# Patient Record
Sex: MALE | Race: OTHER | NOT HISPANIC OR LATINO
[De-identification: names, ages, dates, MRNs, and addresses within clinical notes are randomized per-mention and may not be internally consistent; named-entity substitution may affect disease eponyms.]

---

## 2023-04-04 PROBLEM — Z00.00 ENCOUNTER FOR PREVENTIVE HEALTH EXAMINATION: Status: ACTIVE | Noted: 2023-04-04

## 2023-04-05 ENCOUNTER — APPOINTMENT (OUTPATIENT)
Dept: OTOLARYNGOLOGY | Facility: CLINIC | Age: 38
End: 2023-04-05
Payer: MEDICAID

## 2023-04-05 VITALS — HEIGHT: 65 IN | BODY MASS INDEX: 26.66 KG/M2 | WEIGHT: 160 LBS

## 2023-04-05 DIAGNOSIS — Z86.39 PERSONAL HISTORY OF OTHER ENDOCRINE, NUTRITIONAL AND METABOLIC DISEASE: ICD-10-CM

## 2023-04-05 DIAGNOSIS — Z78.9 OTHER SPECIFIED HEALTH STATUS: ICD-10-CM

## 2023-04-05 DIAGNOSIS — H61.21 IMPACTED CERUMEN, RIGHT EAR: ICD-10-CM

## 2023-04-05 DIAGNOSIS — J30.0 VASOMOTOR RHINITIS: ICD-10-CM

## 2023-04-05 PROCEDURE — 92567 TYMPANOMETRY: CPT

## 2023-04-05 PROCEDURE — 92557 COMPREHENSIVE HEARING TEST: CPT

## 2023-04-05 PROCEDURE — G0268 REMOVAL OF IMPACTED WAX MD: CPT

## 2023-04-05 PROCEDURE — 31231 NASAL ENDOSCOPY DX: CPT

## 2023-04-05 PROCEDURE — 99204 OFFICE O/P NEW MOD 45 MIN: CPT | Mod: 25

## 2023-04-05 RX ORDER — FLUTICASONE PROPIONATE 50 UG/1
50 SPRAY, METERED NASAL
Qty: 1 | Refills: 3 | Status: ACTIVE | COMMUNITY
Start: 2023-04-05 | End: 1900-01-01

## 2023-04-05 RX ORDER — SIMVASTATIN 80 MG/1
TABLET, FILM COATED ORAL
Refills: 0 | Status: ACTIVE | COMMUNITY

## 2023-04-05 NOTE — HISTORY OF PRESENT ILLNESS
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  This is associated with frequently recurrent ear infections throughout his life.  Use of otic drops in the past.  Otorrhea occurs monthly.  Hears better on the left side.  No prior surgery in the ear.

## 2023-04-05 NOTE — DATA REVIEWED
[de-identified] : In light of the patients current symptoms, Complete audiometry was ordered and completed today. I have interpreted these results and reviewed them in detail with the patient.\par \par Bilateral conductive hearing loss

## 2023-04-05 NOTE — PROCEDURE
[FreeTextEntry6] : PROCEDURE:  NASAL ENDOSCOPY  \par \par Surgeon: Dr. Orr\par Indication: Chronic Rhinitis\par Anesthetic: Topical lidocaine and Afrin\par Procedure: The patient was placed in a sitting position.  Following application of the topical anesthetic and decongestant, exam was performed with a flexible endoscope.  The following anatomic sites were directly examined bilaterally in a sequential fashion:\par The scope was introduced in the nasal passage between the middle and inferior turbinates to exam the inferior portion of the middle meatus and the fontanelle, as well as the maxillary ostia. Next, the scope was passed medially and posteriorly to the middle turbinates to examine the sphenoethmoid recess and the superior turbinate region.\par \par Nasopharynx: no masses, choanae patent, no adenoid tissue\par \par The following findings were noted:\par Mild to moderate turbinate hypertrophy causing partial airway obstruction.  The eustachian tube orifice was narrow with surrounding edema bilaterally

## 2023-04-05 NOTE — PHYSICAL EXAM
[Nasal Endoscopy Performed] : nasal endoscopy was performed, see procedure section for findings [Midline] : trachea located in midline position [Normal] : no rashes [FreeTextEntry1] : Microscopic ear exam with cerumen debridement:\par \par Right ear: Obstructing cerumen was debrided from the ear canal using suction, and curet.  The ear canal was within normal limits.  Posterior superior tympanic membrane retraction with atelectasis of the remaining portions of the tympanic membrane.\par \par \par Left ear: The ear canal was patent and nonobstructed.  Anterior superior tympanic membrane perforation with general tympanic membrane retraction [de-identified] : Enlarged

## 2023-04-05 NOTE — ASSESSMENT
[FreeTextEntry1] : Evidence of chronic eustachian tube dysfunction with tympanic membrane retraction and bilateral perforations.  There is significant hearing loss present bilaterally.  The eustachian tube orifice ease appear to be narrowly shot.\par \par I have reviewed this with the patient in detail.  I have carefully reviewed management options.  I recommended consistent use of steroidal nasal spray.  I have referred him for CT scan evaluation of the temporal bone.\par \par We discussed management options including but not limited to surgical repair of the right middle ear, balloon dilation of the eustachian tube, amplification, bone-conduction implant\par \par I have recommended consistent use of steroidal spray with follow-up in approximately 2 months after CT imaging is complete.  Further management discussions will be made.

## 2023-04-05 NOTE — CONSULT LETTER
[Please see my note below.] : Please see my note below. [FreeTextEntry2] : Dear AMY GLORIA  [FreeTextEntry1] : Thank you for allowing me to participate in the care of MD YADAV .\par Please see the attached visit note.\par \par \par \par Epifanio Orr\par Otology\par Medical Director of [facility]\par Department of Otolaryngology\par [facility]

## 2023-04-20 ENCOUNTER — RESULT REVIEW (OUTPATIENT)
Age: 38
End: 2023-04-20

## 2023-04-27 ENCOUNTER — OUTPATIENT (OUTPATIENT)
Dept: OUTPATIENT SERVICES | Facility: HOSPITAL | Age: 38
LOS: 1 days | End: 2023-04-27

## 2023-04-27 ENCOUNTER — APPOINTMENT (OUTPATIENT)
Dept: CT IMAGING | Facility: CLINIC | Age: 38
End: 2023-04-27
Payer: MEDICAID

## 2023-04-27 PROCEDURE — 70480 CT ORBIT/EAR/FOSSA W/O DYE: CPT | Mod: 26

## 2023-05-25 ENCOUNTER — APPOINTMENT (OUTPATIENT)
Dept: OTOLARYNGOLOGY | Facility: CLINIC | Age: 38
End: 2023-05-25
Payer: MEDICAID

## 2023-05-25 VITALS — WEIGHT: 160 LBS | BODY MASS INDEX: 26.66 KG/M2 | HEIGHT: 65 IN

## 2023-05-25 DIAGNOSIS — H69.83 OTHER SPECIFIED DISORDERS OF EUSTACHIAN TUBE, BILATERAL: ICD-10-CM

## 2023-05-25 DIAGNOSIS — H90.0 CONDUCTIVE HEARING LOSS, BILATERAL: ICD-10-CM

## 2023-05-25 DIAGNOSIS — H72.93 OTITIS MEDIA, UNSPECIFIED, BILATERAL: ICD-10-CM

## 2023-05-25 DIAGNOSIS — H61.20 IMPACTED CERUMEN, UNSPECIFIED EAR: ICD-10-CM

## 2023-05-25 DIAGNOSIS — H66.93 OTITIS MEDIA, UNSPECIFIED, BILATERAL: ICD-10-CM

## 2023-05-25 PROCEDURE — 69210 REMOVE IMPACTED EAR WAX UNI: CPT

## 2023-05-25 PROCEDURE — 99214 OFFICE O/P EST MOD 30 MIN: CPT | Mod: 25

## 2023-05-25 RX ORDER — OFLOXACIN OTIC 3 MG/ML
0.3 SOLUTION AURICULAR (OTIC) TWICE DAILY
Qty: 1 | Refills: 3 | Status: ACTIVE | COMMUNITY
Start: 2023-05-25 | End: 1900-01-01

## 2023-05-25 NOTE — REASON FOR VISIT
[Subsequent Evaluation] : a subsequent evaluation for [Hearing Loss] : hearing loss [FreeTextEntry2] : ct review

## 2023-05-25 NOTE — PHYSICAL EXAM
[Normal] : temporomandibular joint is normal [FreeTextEntry1] : Microscopic ear exam with cerumen debridement:\par \par Right ear: Obstructing cerumen was debrided from the ear canal using suction, and curet.  The ear canal was within normal limits.  Posterior superior tympanic membrane retraction with atelectasis of the remaining portions of the tympanic membrane. absent landmarks. No inflammation.\par \par \par Left ear: The ear canal was patent and nonobstructed.  Anterior superior tympanic membrane perforation with general tympanic membrane retraction

## 2023-05-25 NOTE — DATA REVIEWED
[de-identified] : previous audiometry reviewed [de-identified] : cT scan including images reviewed in detail.

## 2023-05-25 NOTE — HISTORY OF PRESENT ILLNESS
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 This is associated with frequently recurrent ear infections throughout his life. Use of otic drops in the past. Otorrhea occurs monthly. Hears better on the left side. No prior surgery in the ear.  [FreeTextEntry1] : 05/25/2023 \par No change in hearing reported. No otorrhea at this time. underwent CT scan.

## 2023-05-25 NOTE — CONSULT LETTER
[Please see my note below.] : Please see my note below. [FreeTextEntry2] : Dear AMY GLORIA  [FreeTextEntry1] : Thank you for allowing me to participate in the care of MD YADAV .\par Please see the attached visit note.\par \par \par \par Epifanio Orr\par Otology\par Medical Director of [facility]\par Department of Otolaryngology\par [facility]

## 2023-05-25 NOTE — ASSESSMENT
[FreeTextEntry1] : Significant bilateral hearing loss with CT evidence of chronic inflammatory changes bilaterally.  Management strategies carefully reviewed with the patient in detail.  We discussed the options of observation, amplification, and surgical intervention.  We discussed the realistic expectations from surgical intervention.\par \par Hearing aid trial recommended\par The option of bone conduction hearing devices discussed.\par \par If the patient wishes surgical intervention, I have recommended eustachian tube balloon dilation for the longstanding eustachian tube dysfunction and tympanic membrane perforations.  Surgical intervention to follow-up would include tympanic membrane repair and ossicular chain reconstruction.

## 2023-06-23 ENCOUNTER — APPOINTMENT (OUTPATIENT)
Dept: OTOLARYNGOLOGY | Facility: CLINIC | Age: 38
End: 2023-06-23
Payer: MEDICAID

## 2023-06-23 PROCEDURE — V5010 ASSESSMENT FOR HEARING AID: CPT

## 2023-08-05 ENCOUNTER — EMERGENCY (EMERGENCY)
Facility: HOSPITAL | Age: 38
LOS: 1 days | Discharge: HOME | End: 2023-08-05
Attending: EMERGENCY MEDICINE
Payer: COMMERCIAL

## 2023-08-05 VITALS
HEART RATE: 81 BPM | DIASTOLIC BLOOD PRESSURE: 97 MMHG | WEIGHT: 161.16 LBS | HEIGHT: 65 IN | OXYGEN SATURATION: 97 % | RESPIRATION RATE: 16 BRPM | TEMPERATURE: 97 F | SYSTOLIC BLOOD PRESSURE: 136 MMHG

## 2023-08-05 PROCEDURE — 69200 CLEAR OUTER EAR CANAL: CPT | Mod: LT

## 2023-08-05 PROCEDURE — 99282 EMERGENCY DEPT VISIT SF MDM: CPT | Mod: 25

## 2023-08-05 PROCEDURE — 99283 EMERGENCY DEPT VISIT LOW MDM: CPT

## 2023-08-05 NOTE — ED ADULT TRIAGE NOTE - CHIEF COMPLAINT QUOTE
As per pt, c/o feels like cotton from the q-tip is stuck in his x2 hours today. Pt denies all other symptoms.

## 2023-08-05 NOTE — ED PROVIDER NOTE - ATTENDING APP SHARED VISIT CONTRIBUTION OF CARE
39 yo M with ear foreign body cotton from Q tip    Foreign body removed from L ear by WILLAM Soriano Dc

## 2023-08-05 NOTE — ED PROVIDER NOTE - OBJECTIVE STATEMENT
Patient is a 38 year old male who comes in complaining of foreign body in left ear canal that started 2 hours ago. Patient presents with cotton stuck in his ear. Patient has no dizziness, drainage, change in hearing or any other complains.

## 2023-08-05 NOTE — ED PROVIDER NOTE - NS ED ATTENDING STATEMENT MOD
This was a shared visit with the FAHAD. I reviewed and verified the documentation and independently performed the documented:

## 2023-08-05 NOTE — ED PROVIDER NOTE - PATIENT PORTAL LINK FT
You can access the FollowMyHealth Patient Portal offered by [facility] by registering at the following website: [email or web address]. By joining [facility]’s FollowMyHealth portal, you will also be able to view your health information using other applications (apps) compatible with our system.

## 2023-09-11 ENCOUNTER — NON-APPOINTMENT (OUTPATIENT)
Age: 38
End: 2023-09-11

## 2023-09-21 ENCOUNTER — APPOINTMENT (OUTPATIENT)
Dept: OTOLARYNGOLOGY | Facility: CLINIC | Age: 38
End: 2023-09-21

## 2023-10-05 ENCOUNTER — APPOINTMENT (OUTPATIENT)
Dept: OTOLARYNGOLOGY | Facility: CLINIC | Age: 38
End: 2023-10-05
Payer: COMMERCIAL

## 2023-10-05 PROCEDURE — V5260G: CUSTOM

## 2023-10-05 PROCEDURE — V5160: CPT

## 2023-10-18 ENCOUNTER — APPOINTMENT (OUTPATIENT)
Dept: OTOLARYNGOLOGY | Facility: CLINIC | Age: 38
End: 2023-10-18
Payer: COMMERCIAL

## 2023-10-18 PROCEDURE — V5266A: CUSTOM

## 2023-10-25 ENCOUNTER — APPOINTMENT (OUTPATIENT)
Dept: OTOLARYNGOLOGY | Facility: CLINIC | Age: 38
End: 2023-10-25
Payer: MEDICAID

## 2023-10-25 PROCEDURE — V5267B: CUSTOM

## 2023-10-25 PROCEDURE — V5266A: CUSTOM

## 2023-10-25 PROCEDURE — V5011: CPT

## 2024-07-26 ENCOUNTER — APPOINTMENT (OUTPATIENT)
Dept: OTOLARYNGOLOGY | Facility: CLINIC | Age: 39
End: 2024-07-26
Payer: COMMERCIAL

## 2024-07-26 DIAGNOSIS — H90.0 CONDUCTIVE HEARING LOSS, BILATERAL: ICD-10-CM

## 2024-07-26 PROCEDURE — 92593: CPT | Mod: NC

## 2024-09-02 NOTE — ED ADULT TRIAGE NOTE - NS ED NURSE BANDS TYPE
Problem: Adult Inpatient Plan of Care  Goal: Plan of Care Review  Outcome: Met  Goal: Patient-Specific Goal (Individualized)  Outcome: Met  Goal: Absence of Hospital-Acquired Illness or Injury  Outcome: Met  Goal: Optimal Comfort and Wellbeing  Outcome: Met  Goal: Readiness for Transition of Care  Outcome: Met     Problem: Sepsis/Septic Shock  Goal: Optimal Coping  Outcome: Met  Goal: Absence of Bleeding  Outcome: Met  Goal: Blood Glucose Level Within Targeted Range  Outcome: Met  Goal: Absence of Infection Signs and Symptoms  Outcome: Met  Goal: Optimal Nutrition Intake  Outcome: Met     Adequate for Discharge   Name band;

## 2024-12-13 ENCOUNTER — INPATIENT (INPATIENT)
Facility: HOSPITAL | Age: 39
LOS: 4 days | Discharge: HOME | DRG: 948 | End: 2024-12-18
Attending: INTERNAL MEDICINE | Admitting: INTERNAL MEDICINE
Payer: MEDICAID

## 2024-12-13 VITALS
WEIGHT: 151.9 LBS | HEIGHT: 65 IN | TEMPERATURE: 98 F | SYSTOLIC BLOOD PRESSURE: 100 MMHG | OXYGEN SATURATION: 100 % | RESPIRATION RATE: 18 BRPM | HEART RATE: 94 BPM | DIASTOLIC BLOOD PRESSURE: 69 MMHG

## 2024-12-13 LAB
ALBUMIN SERPL ELPH-MCNC: 4 G/DL — SIGNIFICANT CHANGE UP (ref 3.5–5)
ALP SERPL-CCNC: 49 U/L — SIGNIFICANT CHANGE UP (ref 40–120)
ALT FLD-CCNC: 54 U/L DA — SIGNIFICANT CHANGE UP (ref 10–60)
ANION GAP SERPL CALC-SCNC: 3 MMOL/L — LOW (ref 5–17)
AST SERPL-CCNC: 23 U/L — SIGNIFICANT CHANGE UP (ref 10–40)
BASOPHILS # BLD AUTO: 0.04 K/UL — SIGNIFICANT CHANGE UP (ref 0–0.2)
BASOPHILS NFR BLD AUTO: 0.2 % — SIGNIFICANT CHANGE UP (ref 0–2)
BILIRUB SERPL-MCNC: 0.5 MG/DL — SIGNIFICANT CHANGE UP (ref 0.2–1.2)
BUN SERPL-MCNC: 40 MG/DL — HIGH (ref 7–18)
CALCIUM SERPL-MCNC: 8.7 MG/DL — SIGNIFICANT CHANGE UP (ref 8.4–10.5)
CHLORIDE SERPL-SCNC: 111 MMOL/L — HIGH (ref 96–108)
CO2 SERPL-SCNC: 27 MMOL/L — SIGNIFICANT CHANGE UP (ref 22–31)
CREAT SERPL-MCNC: 0.83 MG/DL — SIGNIFICANT CHANGE UP (ref 0.5–1.3)
EGFR: 113 ML/MIN/1.73M2 — SIGNIFICANT CHANGE UP
EOSINOPHIL # BLD AUTO: 0 K/UL — SIGNIFICANT CHANGE UP (ref 0–0.5)
EOSINOPHIL NFR BLD AUTO: 0 % — SIGNIFICANT CHANGE UP (ref 0–6)
GLUCOSE SERPL-MCNC: 145 MG/DL — HIGH (ref 70–99)
HCT VFR BLD CALC: 32.6 % — LOW (ref 39–50)
HGB BLD-MCNC: 11 G/DL — LOW (ref 13–17)
IMM GRANULOCYTES NFR BLD AUTO: 0.4 % — SIGNIFICANT CHANGE UP (ref 0–0.9)
LYMPHOCYTES # BLD AUTO: 1.97 K/UL — SIGNIFICANT CHANGE UP (ref 1–3.3)
LYMPHOCYTES # BLD AUTO: 11.1 % — LOW (ref 13–44)
MCHC RBC-ENTMCNC: 30.4 PG — SIGNIFICANT CHANGE UP (ref 27–34)
MCHC RBC-ENTMCNC: 33.7 G/DL — SIGNIFICANT CHANGE UP (ref 32–36)
MCV RBC AUTO: 90.1 FL — SIGNIFICANT CHANGE UP (ref 80–100)
MONOCYTES # BLD AUTO: 0.48 K/UL — SIGNIFICANT CHANGE UP (ref 0–0.9)
MONOCYTES NFR BLD AUTO: 2.7 % — SIGNIFICANT CHANGE UP (ref 2–14)
NEUTROPHILS # BLD AUTO: 15.24 K/UL — HIGH (ref 1.8–7.4)
NEUTROPHILS NFR BLD AUTO: 85.6 % — HIGH (ref 43–77)
NRBC # BLD: 0 /100 WBCS — SIGNIFICANT CHANGE UP (ref 0–0)
PLATELET # BLD AUTO: 232 K/UL — SIGNIFICANT CHANGE UP (ref 150–400)
POTASSIUM SERPL-MCNC: 4.7 MMOL/L — SIGNIFICANT CHANGE UP (ref 3.5–5.3)
POTASSIUM SERPL-SCNC: 4.7 MMOL/L — SIGNIFICANT CHANGE UP (ref 3.5–5.3)
PROT SERPL-MCNC: 6.6 G/DL — SIGNIFICANT CHANGE UP (ref 6–8.3)
RBC # BLD: 3.62 M/UL — LOW (ref 4.2–5.8)
RBC # FLD: 12.7 % — SIGNIFICANT CHANGE UP (ref 10.3–14.5)
SODIUM SERPL-SCNC: 141 MMOL/L — SIGNIFICANT CHANGE UP (ref 135–145)
TROPONIN I, HIGH SENSITIVITY RESULT: 149.4 NG/L — HIGH
WBC # BLD: 17.81 K/UL — HIGH (ref 3.8–10.5)
WBC # FLD AUTO: 17.81 K/UL — HIGH (ref 3.8–10.5)

## 2024-12-13 PROCEDURE — 99285 EMERGENCY DEPT VISIT HI MDM: CPT

## 2024-12-13 PROCEDURE — 71045 X-RAY EXAM CHEST 1 VIEW: CPT | Mod: 26

## 2024-12-13 NOTE — ED PROVIDER NOTE - OBJECTIVE STATEMENT
39-year-old male with a history of CAD and a stent placed last year and history of hypertension, diabetes, hypercholesteremia comes in complaining of dizziness today he states his remote head is spinning that occurs when he is moving around.  He feels weak.  And fatigue.  No nausea, vomiting, diarrhea no fever.

## 2024-12-13 NOTE — ED PROVIDER NOTE - CLINICAL SUMMARY MEDICAL DECISION MAKING FREE TEXT BOX
39-year-old male with a history of CAD and a stent placed last year and history of hypertension, diabetes, hypercholesteremia comes in complaining of dizziness today he states his remote head is spinning that occurs when he is moving around.  He feels weak.  And fatigue.  No nausea, vomiting, diarrhea no fever.    Vital signs are stable.  Patient does not appear to be in distress.  Skin looks pale conjunctiva is pink palmar pallor.  No diaphoresis.  Neuro is nonfocal.  Gait is steady with intermittent episodes of unsteadiness.  No nystagmus.  No focal deficits.  Cardiac exam within normal limits lungs are clear breath sounds are equal bilaterally abdomen is soft nontender.  Plan CBC, chemistry, IV fluids chest x-ray urinalysis. 39-year-old male with a history of CAD and a stent placed last year and history of hypertension, diabetes, hypercholesteremia comes in complaining of dizziness today he states his remote head is spinning that occurs when he is moving around.  He feels weak.  And fatigue.  No nausea, vomiting, diarrhea no fever. patient takes Plavix and aspirin.  And denies chest pain.    Vital signs are stable.  Patient does not appear to be in distress.  Skin looks pale conjunctiva is pink palmar pallor.  No diaphoresis.  Neuro is nonfocal.  Gait is steady with intermittent episodes of unsteadiness.  No nystagmus.  No focal deficits.  Cardiac exam within normal limits lungs are clear breath sounds are equal bilaterally abdomen is soft nontender.  Plan CBC, chemistry, IV fluids chest x-ray urinalysis.

## 2024-12-13 NOTE — ED ADULT NURSE NOTE - CHIEF COMPLAINT QUOTE
Unable to assess due to medical condition
BIBA for dizziness since @ 1500, get dizzy w position changes

## 2024-12-13 NOTE — ED ADULT NURSE NOTE - NSFALLHARMRISKINTERV_ED_ALL_ED
Assistance OOB with selected safe patient handling equipment if applicable/Communicate risk of Fall with Harm to all staff, patient, and family/Provide visual cue: red socks, yellow wristband, yellow gown, etc/Reinforce activity limits and safety measures with patient and family/Bed in lowest position, wheels locked, appropriate side rails in place/Call bell, personal items and telephone in reach/Instruct patient to call for assistance before getting out of bed/chair/stretcher/Non-slip footwear applied when patient is off stretcher/[place] to call system/Physically safe environment - no spills, clutter or unnecessary equipment/Purposeful Proactive Rounding/Room/bathroom lighting operational, light cord in reach

## 2024-12-13 NOTE — ED ADULT NURSE NOTE - OBJECTIVE STATEMENT
Patient came in ED c/o dizziness. Patient denies chest pain. Patient said he have cardiac stent placed last year, patient on Plavix and aspirin. Patient seen the cardiologist last july.

## 2024-12-14 DIAGNOSIS — R42 DIZZINESS AND GIDDINESS: ICD-10-CM

## 2024-12-14 DIAGNOSIS — Z95.5 PRESENCE OF CORONARY ANGIOPLASTY IMPLANT AND GRAFT: Chronic | ICD-10-CM

## 2024-12-14 DIAGNOSIS — I25.10 ATHEROSCLEROTIC HEART DISEASE OF NATIVE CORONARY ARTERY WITHOUT ANGINA PECTORIS: ICD-10-CM

## 2024-12-14 DIAGNOSIS — A41.9 SEPSIS, UNSPECIFIED ORGANISM: ICD-10-CM

## 2024-12-14 DIAGNOSIS — Z29.9 ENCOUNTER FOR PROPHYLACTIC MEASURES, UNSPECIFIED: ICD-10-CM

## 2024-12-14 DIAGNOSIS — I10 ESSENTIAL (PRIMARY) HYPERTENSION: ICD-10-CM

## 2024-12-14 DIAGNOSIS — E78.5 HYPERLIPIDEMIA, UNSPECIFIED: ICD-10-CM

## 2024-12-14 DIAGNOSIS — R79.89 OTHER SPECIFIED ABNORMAL FINDINGS OF BLOOD CHEMISTRY: ICD-10-CM

## 2024-12-14 DIAGNOSIS — E11.9 TYPE 2 DIABETES MELLITUS WITHOUT COMPLICATIONS: ICD-10-CM

## 2024-12-14 LAB
A1C WITH ESTIMATED AVERAGE GLUCOSE RESULT: 5.8 % — HIGH (ref 4–5.6)
ANION GAP SERPL CALC-SCNC: 6 MMOL/L — SIGNIFICANT CHANGE UP (ref 5–17)
APPEARANCE UR: CLEAR — SIGNIFICANT CHANGE UP
APTT BLD: 24.2 SEC — LOW (ref 24.5–35.6)
BASOPHILS # BLD AUTO: 0.03 K/UL — SIGNIFICANT CHANGE UP (ref 0–0.2)
BASOPHILS NFR BLD AUTO: 0.2 % — SIGNIFICANT CHANGE UP (ref 0–2)
BILIRUB UR-MCNC: NEGATIVE — SIGNIFICANT CHANGE UP
BLD GP AB SCN SERPL QL: SIGNIFICANT CHANGE UP
BUN SERPL-MCNC: 42 MG/DL — HIGH (ref 7–18)
CALCIUM SERPL-MCNC: 8.6 MG/DL — SIGNIFICANT CHANGE UP (ref 8.4–10.5)
CHLORIDE SERPL-SCNC: 111 MMOL/L — HIGH (ref 96–108)
CK MB BLD-MCNC: 2 % — SIGNIFICANT CHANGE UP (ref 0–3.5)
CK MB CFR SERPL CALC: 3.1 NG/ML — SIGNIFICANT CHANGE UP (ref 0–3.6)
CK SERPL-CCNC: 153 U/L — SIGNIFICANT CHANGE UP (ref 35–232)
CO2 SERPL-SCNC: 24 MMOL/L — SIGNIFICANT CHANGE UP (ref 22–31)
COLOR SPEC: YELLOW — SIGNIFICANT CHANGE UP
CREAT SERPL-MCNC: 0.88 MG/DL — SIGNIFICANT CHANGE UP (ref 0.5–1.3)
D DIMER BLD IA.RAPID-MCNC: <150 NG/ML DDU — SIGNIFICANT CHANGE UP
DIFF PNL FLD: NEGATIVE — SIGNIFICANT CHANGE UP
EGFR: 111 ML/MIN/1.73M2 — SIGNIFICANT CHANGE UP
EOSINOPHIL # BLD AUTO: 0 K/UL — SIGNIFICANT CHANGE UP (ref 0–0.5)
EOSINOPHIL NFR BLD AUTO: 0 % — SIGNIFICANT CHANGE UP (ref 0–6)
ESTIMATED AVERAGE GLUCOSE: 120 MG/DL — HIGH (ref 68–114)
FLUAV AG NPH QL: SIGNIFICANT CHANGE UP
FLUBV AG NPH QL: SIGNIFICANT CHANGE UP
GLUCOSE BLDC GLUCOMTR-MCNC: 122 MG/DL — HIGH (ref 70–99)
GLUCOSE BLDC GLUCOMTR-MCNC: 177 MG/DL — HIGH (ref 70–99)
GLUCOSE BLDC GLUCOMTR-MCNC: 187 MG/DL — HIGH (ref 70–99)
GLUCOSE BLDC GLUCOMTR-MCNC: 95 MG/DL — SIGNIFICANT CHANGE UP (ref 70–99)
GLUCOSE SERPL-MCNC: 159 MG/DL — HIGH (ref 70–99)
GLUCOSE UR QL: >=1000 MG/DL
HCT VFR BLD CALC: 29.7 % — LOW (ref 39–50)
HGB BLD-MCNC: 10 G/DL — LOW (ref 13–17)
IMM GRANULOCYTES NFR BLD AUTO: 0.7 % — SIGNIFICANT CHANGE UP (ref 0–0.9)
INR BLD: 1.15 RATIO — SIGNIFICANT CHANGE UP (ref 0.85–1.16)
KETONES UR-MCNC: 40 MG/DL
LACTATE SERPL-SCNC: 1.9 MMOL/L — SIGNIFICANT CHANGE UP (ref 0.7–2)
LACTATE SERPL-SCNC: 2.5 MMOL/L — HIGH (ref 0.7–2)
LACTATE SERPL-SCNC: 3 MMOL/L — HIGH (ref 0.7–2)
LACTATE SERPL-SCNC: 3.5 MMOL/L — HIGH (ref 0.7–2)
LEUKOCYTE ESTERASE UR-ACNC: NEGATIVE — SIGNIFICANT CHANGE UP
LYMPHOCYTES # BLD AUTO: 14.9 % — SIGNIFICANT CHANGE UP (ref 13–44)
LYMPHOCYTES # BLD AUTO: 2.43 K/UL — SIGNIFICANT CHANGE UP (ref 1–3.3)
MAGNESIUM SERPL-MCNC: 2.1 MG/DL — SIGNIFICANT CHANGE UP (ref 1.6–2.6)
MCHC RBC-ENTMCNC: 30.7 PG — SIGNIFICANT CHANGE UP (ref 27–34)
MCHC RBC-ENTMCNC: 33.7 G/DL — SIGNIFICANT CHANGE UP (ref 32–36)
MCV RBC AUTO: 91.1 FL — SIGNIFICANT CHANGE UP (ref 80–100)
MONOCYTES # BLD AUTO: 0.28 K/UL — SIGNIFICANT CHANGE UP (ref 0–0.9)
MONOCYTES NFR BLD AUTO: 1.7 % — LOW (ref 2–14)
NEUTROPHILS # BLD AUTO: 13.43 K/UL — HIGH (ref 1.8–7.4)
NEUTROPHILS NFR BLD AUTO: 82.5 % — HIGH (ref 43–77)
NITRITE UR-MCNC: NEGATIVE — SIGNIFICANT CHANGE UP
NRBC # BLD: 0 /100 WBCS — SIGNIFICANT CHANGE UP (ref 0–0)
NT-PROBNP SERPL-SCNC: 74 PG/ML — SIGNIFICANT CHANGE UP (ref 0–125)
PH UR: 5 — SIGNIFICANT CHANGE UP (ref 5–8)
PHOSPHATE SERPL-MCNC: 2.7 MG/DL — SIGNIFICANT CHANGE UP (ref 2.5–4.5)
PLATELET # BLD AUTO: 238 K/UL — SIGNIFICANT CHANGE UP (ref 150–400)
POTASSIUM SERPL-MCNC: 3.9 MMOL/L — SIGNIFICANT CHANGE UP (ref 3.5–5.3)
POTASSIUM SERPL-SCNC: 3.9 MMOL/L — SIGNIFICANT CHANGE UP (ref 3.5–5.3)
PROT UR-MCNC: NEGATIVE MG/DL — SIGNIFICANT CHANGE UP
PROTHROM AB SERPL-ACNC: 13.3 SEC — SIGNIFICANT CHANGE UP (ref 9.9–13.4)
RBC # BLD: 3.26 M/UL — LOW (ref 4.2–5.8)
RBC # FLD: 12.9 % — SIGNIFICANT CHANGE UP (ref 10.3–14.5)
RSV RNA NPH QL NAA+NON-PROBE: SIGNIFICANT CHANGE UP
SARS-COV-2 RNA SPEC QL NAA+PROBE: SIGNIFICANT CHANGE UP
SODIUM SERPL-SCNC: 141 MMOL/L — SIGNIFICANT CHANGE UP (ref 135–145)
SP GR SPEC: 1.04 — HIGH (ref 1–1.03)
T4 FREE SERPL-MCNC: 1 NG/DL — SIGNIFICANT CHANGE UP (ref 0.9–1.7)
TROPONIN I, HIGH SENSITIVITY RESULT: 149.8 NG/L — HIGH
TROPONIN I, HIGH SENSITIVITY RESULT: 154.7 NG/L — HIGH
TSH SERPL-MCNC: 1.99 UU/ML — SIGNIFICANT CHANGE UP (ref 0.34–4.82)
UROBILINOGEN FLD QL: 0.2 MG/DL — SIGNIFICANT CHANGE UP (ref 0.2–1)
WBC # BLD: 16.28 K/UL — HIGH (ref 3.8–10.5)
WBC # FLD AUTO: 16.28 K/UL — HIGH (ref 3.8–10.5)

## 2024-12-14 PROCEDURE — 93010 ELECTROCARDIOGRAM REPORT: CPT

## 2024-12-14 PROCEDURE — 99222 1ST HOSP IP/OBS MODERATE 55: CPT

## 2024-12-14 PROCEDURE — 70450 CT HEAD/BRAIN W/O DYE: CPT | Mod: 26

## 2024-12-14 PROCEDURE — 70481 CT ORBIT/EAR/FOSSA W/DYE: CPT | Mod: 26,59

## 2024-12-14 RX ORDER — EMPAGLIFLOZIN 25 MG/1
1 TABLET, FILM COATED ORAL
Refills: 0 | DISCHARGE

## 2024-12-14 RX ORDER — 0.9 % SODIUM CHLORIDE 0.9 %
1000 INTRAVENOUS SOLUTION INTRAVENOUS ONCE
Refills: 0 | Status: COMPLETED | OUTPATIENT
Start: 2024-12-14 | End: 2024-12-14

## 2024-12-14 RX ORDER — ACETAMINOPHEN 500MG 500 MG/1
650 TABLET, COATED ORAL EVERY 6 HOURS
Refills: 0 | Status: DISCONTINUED | OUTPATIENT
Start: 2024-12-14 | End: 2024-12-18

## 2024-12-14 RX ORDER — 0.9 % SODIUM CHLORIDE 0.9 %
1000 INTRAVENOUS SOLUTION INTRAVENOUS ONCE
Refills: 0 | Status: DISCONTINUED | OUTPATIENT
Start: 2024-12-14 | End: 2024-12-14

## 2024-12-14 RX ORDER — PIPERACILLIN SODIUM AND TAZOBACTAM SODIUM 4; .5 G/20ML; G/20ML
3.38 INJECTION, POWDER, LYOPHILIZED, FOR SOLUTION INTRAVENOUS EVERY 8 HOURS
Refills: 0 | Status: DISCONTINUED | OUTPATIENT
Start: 2024-12-14 | End: 2024-12-14

## 2024-12-14 RX ORDER — ONDANSETRON HYDROCHLORIDE 4 MG/1
4 TABLET, FILM COATED ORAL ONCE
Refills: 0 | Status: COMPLETED | OUTPATIENT
Start: 2024-12-14 | End: 2024-12-14

## 2024-12-14 RX ORDER — 0.9 % SODIUM CHLORIDE 0.9 %
1100 INTRAVENOUS SOLUTION INTRAVENOUS ONCE
Refills: 0 | Status: COMPLETED | OUTPATIENT
Start: 2024-12-14 | End: 2024-12-14

## 2024-12-14 RX ORDER — CEFEPIME 2 G/1
1000 INJECTION, POWDER, FOR SOLUTION INTRAVENOUS EVERY 8 HOURS
Refills: 0 | Status: DISCONTINUED | OUTPATIENT
Start: 2024-12-14 | End: 2024-12-17

## 2024-12-14 RX ORDER — PIPERACILLIN SODIUM AND TAZOBACTAM SODIUM 4; .5 G/20ML; G/20ML
3.38 INJECTION, POWDER, LYOPHILIZED, FOR SOLUTION INTRAVENOUS ONCE
Refills: 0 | Status: COMPLETED | OUTPATIENT
Start: 2024-12-14 | End: 2024-12-14

## 2024-12-14 RX ORDER — SODIUM CHLORIDE 9 MG/ML
1000 INJECTION, SOLUTION INTRAMUSCULAR; INTRAVENOUS; SUBCUTANEOUS ONCE
Refills: 0 | Status: COMPLETED | OUTPATIENT
Start: 2024-12-14 | End: 2024-12-14

## 2024-12-14 RX ORDER — SODIUM CHLORIDE 9 MG/ML
1000 INJECTION, SOLUTION INTRAMUSCULAR; INTRAVENOUS; SUBCUTANEOUS
Refills: 0 | Status: DISCONTINUED | OUTPATIENT
Start: 2024-12-14 | End: 2024-12-15

## 2024-12-14 RX ORDER — INFLUENZA VIRUS VACCINE 15; 15; 15; 15 UG/.5ML; UG/.5ML; UG/.5ML; UG/.5ML
0.5 SUSPENSION INTRAMUSCULAR ONCE
Refills: 0 | Status: DISCONTINUED | OUTPATIENT
Start: 2024-12-14 | End: 2024-12-18

## 2024-12-14 RX ORDER — ONDANSETRON HYDROCHLORIDE 4 MG/1
4 TABLET, FILM COATED ORAL EVERY 8 HOURS
Refills: 0 | Status: DISCONTINUED | OUTPATIENT
Start: 2024-12-14 | End: 2024-12-18

## 2024-12-14 RX ORDER — CLOPIDOGREL 75 MG/1
75 TABLET, FILM COATED ORAL DAILY
Refills: 0 | Status: DISCONTINUED | OUTPATIENT
Start: 2024-12-14 | End: 2024-12-15

## 2024-12-14 RX ORDER — VANCOMYCIN HCL 900 MCG/MG
1000 POWDER (GRAM) MISCELLANEOUS EVERY 12 HOURS
Refills: 0 | Status: DISCONTINUED | OUTPATIENT
Start: 2024-12-14 | End: 2024-12-16

## 2024-12-14 RX ORDER — CEFEPIME 2 G/1
1000 INJECTION, POWDER, FOR SOLUTION INTRAVENOUS ONCE
Refills: 0 | Status: COMPLETED | OUTPATIENT
Start: 2024-12-14 | End: 2024-12-14

## 2024-12-14 RX ORDER — MECLIZINE HCL 12.5 MG
25 TABLET ORAL ONCE
Refills: 0 | Status: COMPLETED | OUTPATIENT
Start: 2024-12-14 | End: 2024-12-14

## 2024-12-14 RX ORDER — CEFEPIME 2 G/1
INJECTION, POWDER, FOR SOLUTION INTRAVENOUS
Refills: 0 | Status: DISCONTINUED | OUTPATIENT
Start: 2024-12-14 | End: 2024-12-17

## 2024-12-14 RX ORDER — CEFTRIAXONE SODIUM 1 G
2 VIAL (EA) INJECTION EVERY 24 HOURS
Refills: 0 | Status: DISCONTINUED | OUTPATIENT
Start: 2024-12-14 | End: 2024-12-14

## 2024-12-14 RX ORDER — MECLIZINE HCL 12.5 MG
25 TABLET ORAL EVERY 8 HOURS
Refills: 0 | Status: DISCONTINUED | OUTPATIENT
Start: 2024-12-14 | End: 2024-12-18

## 2024-12-14 RX ADMIN — PIPERACILLIN SODIUM AND TAZOBACTAM SODIUM 200 GRAM(S): 4; .5 INJECTION, POWDER, LYOPHILIZED, FOR SOLUTION INTRAVENOUS at 08:46

## 2024-12-14 RX ADMIN — Medication 1100 MILLILITER(S): at 08:45

## 2024-12-14 RX ADMIN — Medication 250 MILLIGRAM(S): at 17:30

## 2024-12-14 RX ADMIN — Medication 40 MILLIGRAM(S): at 22:27

## 2024-12-14 RX ADMIN — Medication 81 MILLIGRAM(S): at 11:46

## 2024-12-14 RX ADMIN — CLOPIDOGREL 75 MILLIGRAM(S): 75 TABLET, FILM COATED ORAL at 11:46

## 2024-12-14 RX ADMIN — Medication 1000 MILLILITER(S): at 06:08

## 2024-12-14 RX ADMIN — Medication 1000 MILLILITER(S): at 05:08

## 2024-12-14 RX ADMIN — CEFEPIME 100 MILLIGRAM(S): 2 INJECTION, POWDER, FOR SOLUTION INTRAVENOUS at 14:42

## 2024-12-14 RX ADMIN — Medication 1: at 12:13

## 2024-12-14 RX ADMIN — SODIUM CHLORIDE 110 MILLILITER(S): 9 INJECTION, SOLUTION INTRAMUSCULAR; INTRAVENOUS; SUBCUTANEOUS at 17:29

## 2024-12-14 RX ADMIN — PIPERACILLIN SODIUM AND TAZOBACTAM SODIUM 25 GRAM(S): 4; .5 INJECTION, POWDER, LYOPHILIZED, FOR SOLUTION INTRAVENOUS at 11:47

## 2024-12-14 RX ADMIN — Medication 25 MILLIGRAM(S): at 01:01

## 2024-12-14 RX ADMIN — SODIUM CHLORIDE 1000 MILLILITER(S): 9 INJECTION, SOLUTION INTRAMUSCULAR; INTRAVENOUS; SUBCUTANEOUS at 14:41

## 2024-12-14 RX ADMIN — Medication 81 MILLIGRAM(S): at 01:01

## 2024-12-14 RX ADMIN — ONDANSETRON HYDROCHLORIDE 4 MILLIGRAM(S): 4 TABLET, FILM COATED ORAL at 00:45

## 2024-12-14 RX ADMIN — CEFEPIME 100 MILLIGRAM(S): 2 INJECTION, POWDER, FOR SOLUTION INTRAVENOUS at 22:27

## 2024-12-14 NOTE — CONSULT NOTE ADULT - SUBJECTIVE AND OBJECTIVE BOX
Patient is a 39y old  Male with PMH of chronic b/l otitis media, STEMI () s/p stent, HLD, HTN, and DM, now presents to the ER for evaluation of acute dizziness. left ear tinnitus/fullness, and generalized weakness. On admission, he found to have no fever but tachycardia and Leukocytosis. The CXR  and urine analysis is negative, blood cultures and CT scan of ear/head is pending. He has started on Zosyn and IV Vancomycin, and the ID consult requested to assist with further evaluation and antibiotic management,      REVIEW OF SYSTEMS: Total of twelve systems have been reviewed with patient and found to be negative unless mentioned in HPI      PAST MEDICAL & SURGICAL HISTORY:  Chronic otitis media  Eustachian tube dysfunction, bilateral  STEMI (ST elevation myocardial infarction)  HTN (hypertension)  HLD (hyperlipidemia)  DM (diabetes mellitus)  S/P coronary artery stent placement      SOCIAL HISTORY  Alcohol: Does not drink  Tobacco: Does not smoke  Illicit substance use: None      FAMILY HISTORY: Non contributory to the present illness      ALLERGIES: No Known Allergies      Vital Signs Last 24 Hrs  T(C): 36.9 (14 Dec 2024 11:40), Max: 36.9 (14 Dec 2024 11:40)  T(F): 98.4 (14 Dec 2024 11:40), Max: 98.4 (14 Dec 2024 11:40)  HR: 104 (14 Dec 2024 11:40) (94 - 114)  BP: 92/55 (14 Dec 2024 11:40) (92/55 - 102/62)  BP(mean): 75 (14 Dec 2024 04:10) (75 - 76)  RR: 18 (14 Dec 2024 11:40) (17 - 18)  SpO2: 100% (14 Dec 2024 11:40) (98% - 100%)    Parameters below as of 14 Dec 2024 07:30  Patient On (Oxygen Delivery Method): room air      PHYSICAL EXAM:  GENERAL: Not in distress   HEENT: Left ear has hearing aid, but no discharge noted  CHEST/LUNG:  Air entry bilaterally  HEART: s1 and s2 present  ABDOMEN:  Nontender and  Nondistended  EXTREMITIES: No pedal  edema  CNS: Awake and Alert      LABS:                        10.0   16.28 )-----------( 238      ( 14 Dec 2024 05:00 )             29.7       12-14    141  |  111[H]  |  42[H]  ----------------------------<  159[H]  3.9   |  24  |  0.88    Ca    8.6      14 Dec 2024 05:00  Phos  2.7       Mg     2.1         TPro  6.6  /  Alb  4.0  /  TBili  0.5  /  DBili  x   /  AST  23  /  ALT  54  /  AlkPhos  49      PT/INR - ( 14 Dec 2024 05:00 )   PT: 13.3 sec;   INR: 1.15 ratio      PTT - ( 14 Dec 2024 05:00 )  PTT:24.2 sec      Urinalysis Basic - ( 14 Dec 2024 05:00 )  Color: Yellow / Appearance: Clear / S.042 / pH: x  Gluc: 159 mg/dL / Ketone: 40 mg/dL  / Bili: Negative / Urobili: 0.2 mg/dL   Blood: x / Protein: Negative mg/dL / Nitrite: Negative   Leuk Esterase: Negative / RBC: x / WBC x   Sq Epi: x / Non Sq Epi: x / Bacteria: x      CAPILLARY BLOOD GLUCOSE  POCT Blood Glucose.: 187 mg/dL (14 Dec 2024 12:02)  POCT Blood Glucose.: 122 mg/dL (14 Dec 2024 07:20)        MEDICATIONS  (STANDING):  aspirin  chewable 81 milliGRAM(s) Oral daily  atorvastatin 40 milliGRAM(s) Oral at bedtime  clopidogrel Tablet 75 milliGRAM(s) Oral daily  insulin lispro (ADMELOG) corrective regimen sliding scale   SubCutaneous three times a day before meals  insulin lispro (ADMELOG) corrective regimen sliding scale   SubCutaneous at bedtime  piperacillin/tazobactam IVPB.. 3.375 Gram(s) IV Intermittent every 8 hours  sodium chloride 0.9% Bolus 1000 milliLiter(s) IV Bolus once  vancomycin  IVPB 1000 milliGRAM(s) IV Intermittent every 12 hours    MEDICATIONS  (PRN):  acetaminophen     Tablet .. 650 milliGRAM(s) Oral every 6 hours PRN Temp greater or equal to 38C (100.4F), Mild Pain (1 - 3)  meclizine 25 milliGRAM(s) Oral every 8 hours PRN Dizziness  ondansetron Injectable 4 milliGRAM(s) IV Push every 8 hours PRN Nausea and/or Vomiting        RADIOLOGY & ADDITIONAL TESTS:    24 : Xray Chest 1 View-PORTABLE IMMEDIATE (Xray Chest 1 View-PORTABLE IMMEDIATE .) (24 @ 23:54) The heart is normal in size.    The lungs are clear. No pneumothorax. No pleural effusion.  No acute osseous abnormalities.

## 2024-12-14 NOTE — CONSULT NOTE ADULT - ASSESSMENT
39y M with PMH of chronic b/l otitis media, STEMI (2023) s/p stent at [facility] , HLD, HTN, and DM presenting with vertigo, left ear tinnitus/fullness, and generalized weakness. Patient states that yesterday he started feeling "bad", described as generalized weakness, and later suddenly developed dizziness described as head spinning while standing up in the bathroom,sespsis. and mild elevated troponins.  1.Sepsis-pan cx,emprirc abx.  2.CAD-asa,plavix,statin-hold b blocer.  3.Hypotension-s/p 2 L IVF.  4.DM-Insulin, check A1c.  5.Lipid d/o-statin.  6.GI and DVT prophylaxis.

## 2024-12-14 NOTE — CONSULT NOTE ADULT - SUBJECTIVE AND OBJECTIVE BOX
Date of Service  12-14-24 @ 12:05    CHIEF COMPLAINT:Patient is a 39y old  Male who presents with a chief complaint of acute vertigo w/ severe sepsis .      HPI:  39y M with PMH of chronic b/l otitis media, STEMI (2023) s/p stent at [facility] , HLD, HTN, and DM presenting with vertigo, left ear tinnitus/fullness, and generalized weakness. Patient states that yesterday he started feeling "bad", described as generalized weakness, and later suddenly developed dizziness described as head spinning while standing up in the bathroom. Says he immediately laid down on the floor and felt better. After lying on the floor for a few minutes he stood up but says he quickly developed the same head spinning sensation and instability. Says he has never experienced similar symptoms in the past and was worried symptoms were related to his heart given recent STEMI so he came to the ED. Endorses a ringing in his left ear as well as feeling of fullness in the left ear since yesterday however denies pain or discharge from the ear. Uses hearing aide in L ear only.  Patient has a history of frequent ear infections and states that in the past they have been associated pain and and fluid leaking from the ears. Per prior ENT notes in [facility] patient has known bilateral eustachian tube dysfunction and has been offered balloon dilation procedure in the past. Patient denies fever, chills, headache, chest pain, palpitations, shortness of breath, abdominal pain, nausea, vomiting, diarrhea, constipation, and dysuria. When asked if he thought his left face was swollen he said he had not noticed but when showed a picture of himself on cell phone he agreed that the left cheek and jaw appears more swollen than baseline.       PAST MEDICAL & SURGICAL HISTORY:  Chronic otitis media      Eustachian tube dysfunction, bilateral      STEMI (ST elevation myocardial infarction)      HTN (hypertension)      HLD (hyperlipidemia)      DM (diabetes mellitus)      S/PMI and  coronary artery stent placement 2023 at [place]          MEDICATIONS  (STANDING):  aspirin  chewable 81 milliGRAM(s) Oral daily  atorvastatin 40 milliGRAM(s) Oral at bedtime  clopidogrel Tablet 75 milliGRAM(s) Oral daily  insulin lispro (ADMELOG) corrective regimen sliding scale   SubCutaneous three times a day before meals  insulin lispro (ADMELOG) corrective regimen sliding scale   SubCutaneous at bedtime  piperacillin/tazobactam IVPB.. 3.375 Gram(s) IV Intermittent every 8 hours  vancomycin  IVPB 1000 milliGRAM(s) IV Intermittent every 12 hours    MEDICATIONS  (PRN):  acetaminophen     Tablet .. 650 milliGRAM(s) Oral every 6 hours PRN Temp greater or equal to 38C (100.4F), Mild Pain (1 - 3)  meclizine 25 milliGRAM(s) Oral every 8 hours PRN Dizziness  ondansetron Injectable 4 milliGRAM(s) IV Push every 8 hours PRN Nausea and/or Vomiting      FAMILY HISTORY:Father-CAD.      SOCIAL HISTORY:    [x ] Non-smoker    [x ] Alcohol-denies    Allergies    No Known Allergies    Intolerances    	    REVIEW OF SYSTEMS:  CONSTITUTIONAL: No fever, weight loss, or fatigue  EYES: No eye pain, visual disturbances, or discharge  ENT:  No difficulty hearing, tinnitus, vertigo; No sinus or throat pain  NECK: No pain or stiffness  RESPIRATORY: No cough, wheezing, chills or hemoptysis; No Shortness of Breath  CARDIOVASCULAR: No chest pain, palpitations, passing out, + dizziness, or leg swelling  GASTROINTESTINAL: No abdominal or epigastric pain. No nausea, vomiting, or hematemesis; No diarrhea or constipation. No melena or hematochezia.  GENITOURINARY: No dysuria, frequency, hematuria, or incontinence  NEUROLOGICAL: No headaches, memory loss, loss of strength, numbness, or tremors  SKIN: No itching, burning, rashes, or lesions   LYMPH Nodes: No enlarged glands  ENDOCRINE: No heat or cold intolerance; No hair loss  MUSCULOSKELETAL: No joint pain or swelling; No muscle, back, or extremity pain  PSYCHIATRIC: No depression, anxiety, mood swings, or difficulty sleeping  HEME/LYMPH: No easy bruising, or bleeding gums  ALLERGY AND IMMUNOLOGIC: No hives or eczema	      PHYSICAL EXAM:  T(C): 36.9 (12-14-24 @ 11:40), Max: 36.9 (12-14-24 @ 11:40)  HR: 104 (12-14-24 @ 11:40) (94 - 114)  BP: 92/55 (12-14-24 @ 11:40) (92/55 - 102/62)  RR: 18 (12-14-24 @ 11:40) (17 - 18)  SpO2: 100% (12-14-24 @ 11:40) (98% - 100%)  Wt(kg): --  I&O's Summary      Appearance: Normal	  HEENT:   Normal oral mucosa, PERRL, EOMI	  Lymphatic: No lymphadenopathy  Cardiovascular: Normal S1 S2, No JVD, No murmurs, No edema  Respiratory: Lungs clear to auscultation	  Psychiatry: A & O x 3, Mood & affect appropriate  Gastrointestinal:  Soft, Non-tender, + BS	  Skin: No rashes, No ecchymoses, No cyanosis	  Neurologic: Non-focal  Extremities: Normal range of motion, No clubbing, cyanosis or edema  Vascular: Peripheral pulses palpable 2+ bilaterally    	    ECG:  	nsr, q v1-v3  	  	  LABS:	 	      CARDIAC MARKERS ( 14 Dec 2024 05:00 )  x     / x     / x     / x     / 3.1 ng/mL      Troponin I, High Sensitivity Result: 149.8 ng/L (12-14-24 @ 05:00)  Troponin I, High Sensitivity Result: 154.7 ng/L (12-14-24 @ 00:35)  Troponin I, High Sensitivity Result: 149.4 ng/L (12-13-24 @ 23:10)                          10.0   16.28 )-----------( 238      ( 14 Dec 2024 05:00 )             29.7     12-14    141  |  111[H]  |  42[H]  ----------------------------<  159[H]  3.9   |  24  |  0.88    Ca    8.6      14 Dec 2024 05:00  Phos  2.7     12-14  Mg     2.1     12-14    TPro  6.6  /  Alb  4.0  /  TBili  0.5  /  DBili  x   /  AST  23  /  ALT  54  /  AlkPhos  49  12-13    proBNP:   Lipid Profile:   HgA1c:   TSH: Thyroid Stimulating Hormone, Serum: 1.99 uU/mL (12-14 @ 05:00)    < from: Xray Chest 1 View-PORTABLE IMMEDIATE (Xray Chest 1 View-PORTABLE IMMEDIATE .) (12.13.24 @ 23:54) >  ACC: [id number] EXAM:  XR CHEST PORTABLE IMMED 1V   ORDERED BY: PORTER QUIROS     PROCEDURE DATE:  12/13/2024          INTERPRETATION:  EXAMINATION: XR CHEST IMMEDIATE    CLINICAL INDICATION: Dizziness    TECHNIQUE: Single frontal view of the chest was obtained.    COMPARISON: None.    FINDINGS:    The heart is normal in size.    The lungs are clear. No pneumothorax. No pleural effusion.    No acute osseous abnormalities.    IMPRESSION:  Clear lungs.    --- End of Report ---             ALYCE PRESCOTT DO; Attending Radiologist  This document has been electronically signed. Dec 14 2024  7:38AM    < end of copied text >  Urinalysis with Rflx Culture (12.14.24 @ 05:00)   Urine Appearance: Clear  Color: Yellow  Specific Gravity: 1.042  pH Urine: 5.0  Protein, Urine: Negative mg/dL  Glucose Qualitative, Urine: >=1000 mg/dL  Ketone - Urine: 40 mg/dL  Blood, Urine: Negative  Bilirubin: Negative  Urobilinogen: 0.2 mg/dL  Leukocyte Esterase Concentration: Negative  Nitrite: Negative

## 2024-12-14 NOTE — H&P ADULT - NS ATTEST RISK PROBLEM GEN_ALL_CORE FT
Severe Sepsis with possible L ear infection as a source. Would need CTH w/ IAC to r/o possible abscess. Admit to telemetry for elevated troponin (likely demand).

## 2024-12-14 NOTE — H&P ADULT - ASSESSMENT
39y M with PMH of chronic b/l otitis media, STEMI (2023) s/p stent, HLD, HTN, and DM presenting with vertigo, left ear tinnitus/fullness, and generalized weakness. Admitted to telemetry for severe sepsis likely 2/2 L ear infection associated with vertigo and elevated troponin, likely demand ischemia.

## 2024-12-14 NOTE — H&P ADULT - NSHPSOCIALHISTORY_GEN_ALL_CORE
, lives at home with wife and kids, works for [employer], denies tobacco use, denies etoh, denies illicit substance use

## 2024-12-14 NOTE — H&P ADULT - ATTENDING COMMENTS
Patient is a 38 y/o M w/ PMHx of SNHL 2/2 chronic bilateral otitis media, STEMI (2023) s/p stent, HLD, HTN, DM. Admitted for severe sepsis 2/2 L ear infection, and elevated troponin likely 2/2 demand ischemia.    VITALS   T(C): 36.8 (12-14-24 @ 04:10), Max: 36.8 (12-14-24 @ 04:10)  HR: 112 (12-14-24 @ 04:10) (94 - 112)  BP: 98/65 (12-14-24 @ 04:10) (98/64 - 100/69)  RR: 18 (12-14-24 @ 04:10) (18 - 18)  SpO2: 99% (12-14-24 @ 04:10) (98% - 100%)12-14-24 @ 07:16    GENERAL APPEARANCE: Well developed, AA0x3, in NAD   HEENT: Normocephalic, PERRL, EOMI, (+) L hearing aid w/ scarring of L tympanic membrane; (+) mild swelling of the L side of the face  CARDIAC: Normal S1 and S2. No S3, S4 or murmurs. Rhythm is regular. There is no peripheral edema, cyanosis or pallor.  LUNGS: Clear to auscultation and percussion without rales, rhonchi, wheezing or diminished breath sounds.  ABDOMEN: Positive bowel sounds. Soft, nondistended, nontender. No guarding or rebound. No masses.  EXTREMITIES: No significant deformity or joint abnormality. No edema. Peripheral pulses intact.   NEUROLOGICAL: CN II-XII intact. Strength and sensation symmetric and intact throughout. Reflexes 2+ throughout.   SKIN: No skin breakdown, lesions or rashes noted     PERTINENT LABS   Sodium: 141 mmol/L (12-14-24 @ 05:00)  Potassium: 3.9 mmol/L (12-14-24 @ 05:00)  Chloride: 111 mmol/L (12-14-24 @ 05:00)  Carbon Dioxide: 24 mmol/L (12-14-24 @ 05:00)  Glucose: 159 mg/dL (12-14-24 @ 05:00)  Blood Urea Nitrogen: 42 mg/dL (12-14-24 @ 05:00)  Creatinine: 0.88 mg/dL (12-14-24 @ 05:00)  Hemoglobin: 10.0 g/dL (12-14-24 @ 05:00)  Hematocrit: 29.7 % (12-14-24 @ 05:00)  Platelet Count - Automated: 238 K/uL (12-14-24 @ 05:00)  Bilirubin: Negative (12-14-24 @ 05:00)  Hemoglobin: 11.0 g/dL (12-13-24 @ 23:10)  Hematocrit: 32.6 % (12-13-24 @ 23:10)  Platelet Count - Automated: 232 K/uL (12-13-24 @ 23:10)  Sodium: 141 mmol/L (12-13-24 @ 23:10)  Potassium: 4.7 mmol/L (12-13-24 @ 23:10)  Chloride: 111 mmol/L (12-13-24 @ 23:10)  Carbon Dioxide: 27 mmol/L (12-13-24 @ 23:10)  Glucose: 145 mg/dL (12-13-24 @ 23:10)  Blood Urea Nitrogen: 40 mg/dL (12-13-24 @ 23:10)  Albumin: 4.0 g/dL (12-13-24 @ 23:10)  Creatinine: 0.83 mg/dL (12-13-24 @ 23:10)  Aspartate Aminotransferase (AST/SGOT): 23 U/L (12-13-24 @ 23:10)  Alanine Aminotransferase (ALT/SGPT): 54 U/L DA (12-13-24 @ 23:10)  Alkaline Phosphatase: 49 U/L (12-13-24 @ 23:10)      EKG reviewed: NSR, no ischemic changes  Pertinent Imaging:    ASSESSMENT AND PLAN:  #Severe Sepsis  - p/w generalized weakness, vertigo, L ear tinnitus/ fullness, L sided facial swelling  - inc. WBC, HR and Lactate  - s/p 2L LR bolus  - start Vanco and Zosyn to cover for MRSA and Pseudomonas respectively  - f/u CT IAC w/ IV contrast  - f/u RVP, BCx, UA w/ reflex  - trend lactate  - ID consult    #Acute Severe Vertigo  - Hx of chronic otitis media  - start meclizine prn  - start zofran prn  - fall precaution  - PT consult    #CAD  - Hx of STEMI (2023) s/p stent  - hold metoprolol in setting of sepsis  - Trop elevated - 149>154 (likely demand)  - admit to telemetry  - continue statin, plavix and ASA    #HTN  - hold BP meds metoprolol and losartan due to low BP 2/2 sepsis    Rest of management as above. Patient will be covered by hospitalist service 12/14/2024. Dr. Boateng would take-over his care starting 12/15/2024.

## 2024-12-14 NOTE — CONSULT NOTE ADULT - ASSESSMENT
Patient is a 39y old  Male with PMH of chronic b/l otitis media, STEMI (2023) s/p stent, HLD, HTN, and DM, now presents to the ER for evaluation of acute dizziness. left ear tinnitus/fullness, and generalized weakness. On admission, he found to have no fever but tachycardia and Leukocytosis. The CXR  and urine analysis is negative, blood cultures and CT scan of ear/head is pending. He has started on Zosyn and IV Vancomycin, and the ID consult requested to assist with further evaluation and antibiotic management,    # Sepsis ( Tachycardia + Leukocytosis)   # Probable Left otitis media- CT scan is pending    would recommend     1. CT scan of head to rule out bleed in the setting of dizziness and H/o HTN  2. Follow up Blood cultures and CT scan of  Ears  3. Please change Zosyn to Cefepime and continue  IV Vancomycin until work up is done  4. Fall precautions  5. Monitor WBC count and Lactic acid level    d/w Patient and Covering NP, Jessi    will follow the patient with you and make further recommendation based on the clinical course and Lab results  Thank you for the opportunity to participate in Mr. YADAV's care    Attending Attestation:    Spent more than 65 minutes on total encounter, more than 50 % of the visit was spent counseling and/or coordinating care by the Attending physician.

## 2024-12-14 NOTE — H&P ADULT - PROBLEM SELECTOR PLAN 1
p/w acute onset generalized weakness associated with vertigo L ear tinnitus/fullness, and L mandibular edema  -WBC 17.81,  HR , Lactate 2.5 meeting severe sepsis criteria with suspected source L ear infection  -CXR clear lungs (wet read)  -BP 98/65-->give 2L LR as bolus  -start ceftriaxone 2g IV qd x 3 days  -c/w maintenance IVF  -f/u CT internal auditory canals w/ IV contrast  -f/u RVP  -f/u BCx, UA/UCx  -f/u repeat lactate p/w acute onset generalized weakness associated with vertigo L ear tinnitus/fullness, and L mandibular edema  -WBC 17.81,  HR , Lactate 2.5 meeting severe sepsis criteria with suspected source L ear infection  -CXR clear lungs (wet read)  -BP 98/65-->give 2L LR as bolus  -start empiric vancomycin and zosyn for now  -ID consult in AM  -c/w maintenance IVF  -f/u CT internal auditory canals w/ IV contrast  -f/u RVP  -f/u BCx, UA/UCx  -f/u repeat lactate

## 2024-12-14 NOTE — H&P ADULT - PROBLEM SELECTOR PLAN 2
p/w acute onset spinning sensation and weakness worse with standing and improved by lying still  -hx of chronic otitis media complaining of L ear tinitus/fullness but no pain  -tympanic membrane not inflammed without effusion or perforation on otoscopic exam  -low concern for central vertigo  -f/u CT internal auditory canals w/ IV contrast  -c/w meclizine 25mg q8 PRN  -zofran PRN for nausea  -Fall risk protocol/ambulate with assistance p/w acute onset spinning sensation and weakness worse with standing and improved by lying still  -hx of chronic otitis media complaining of L ear tinnitus/fullness but no pain  -tympanic membrane not inflamed without effusion or perforation on otoscopic exam  -low concern for central vertigo  -f/u CT internal auditory canals w/ IV contrast  -c/w meclizine 25mg q8 PRN  -zofran PRN for nausea  -Fall risk protocol/ambulate with assistance  -PT consult  -Neuro consult in AM

## 2024-12-14 NOTE — H&P ADULT - PROBLEM SELECTOR PLAN 4
h/o HTN on metoprolol and losartan  -hold home med in s/o sepsis  -monitor BP  -adjust antihypertensive meds as appropriate

## 2024-12-14 NOTE — H&P ADULT - PROBLEM SELECTOR PLAN 6
h/o DM on jardiance  - hold oral dm meds  -f/u A1c  -c/w low sliding scale  -Adjust insulin as indicated  -FS ACHS  -carb steady diet

## 2024-12-14 NOTE — PATIENT PROFILE ADULT - FALL HARM RISK - HARM RISK INTERVENTIONS
Assistance with ambulation/Assistance OOB with selected safe patient handling equipment/Communicate Risk of Fall with Harm to all staff/Discuss with provider need for PT consult/Monitor gait and stability/Reinforce activity limits and safety measures with patient and family/Tailored Fall Risk Interventions/Visual Cue: Yellow wristband and red socks/Bed in lowest position, wheels locked, appropriate side rails in place/Call bell, personal items and telephone in reach/Instruct patient to call for assistance before getting out of bed or chair/Non-slip footwear when patient is out of bed/Vendor to call system/Physically safe environment - no spills, clutter or unnecessary equipment/Purposeful Proactive Rounding/Room/bathroom lighting operational, light cord in reach

## 2024-12-14 NOTE — PATIENT PROFILE ADULT - FALL HARM RISK - FACTORS NURSING JUDGEMENT
Ventricular Rate : 74   Atrial Rate : 74   P-R Interval : 166   QRS Duration : 84   Q-T Interval : 416   QTC Calculation(Bezet) : 461   P Axis : 38   R Axis : 7   T Axis : 33   Diagnosis : Sinus rhythm~~Confirmed by Vikash Cox ([id number]) on 2/5/2018 9:35:40 AM     
Yes

## 2024-12-14 NOTE — H&P ADULT - HISTORY OF PRESENT ILLNESS
39y M with PMH of chronic b/l otitis media, STEMI (2023) s/p stent, HLD, HTN, and DM presenting with vertigo, left ear tinnitus/fullness, and generalized weakness. Patient states that yesterday he started feeling "bad", described as generalized weakness, and later suddenly developed dizziness described as head spinning while standing up in the bathroom. Says he immediately laid down on the floor and felt better. After lying on the floor for a few minutes he stood up but says he quickly developed the same head spinning sensation and instability. Says he has never experienced similar symptoms in the past and was worried symptoms were related to his heart given recent STEMI so he came to the ED. Endorses a ringing in his left ear as well as feeling of fullness in the left ear since yesterday however denies pain or discharge from the ear. Uses hearing aide in L ear only.  Patient has a history of frequent ear infections and states that in the past they have been associated pain and and fluid leaking from the ears. Per prior ENT notes in [facility] patient has known bilateral eustachian tube dysfunction and has been offered balloon dilation procedure in the past. Patient denies fever, chills, headache, chest pain, palpitations, shortness of breath, abdominal pain, nausea, vomiting, diarrhea, constipation, and dysuria. When asked if he thought his left face was swollen he said he had not noticed but when showed a picture of himself on cell phone he agreed that the left cheek and jaw appears more swollen than baseline.

## 2024-12-14 NOTE — PATIENT PROFILE ADULT - FUNCTIONAL ASSESSMENT - BASIC MOBILITY 2.
Patient sent a message  Ayad Winters He received a letter to schedule a colonoscopy     This is a recall     Please phone [phone]
Per Dr Haim Claire, no further colonoscopies needed 
4 = No assist / stand by assistance

## 2024-12-14 NOTE — H&P ADULT - PROBLEM SELECTOR PLAN 3
hx of STEMI in 2023 s/p stent on ASA, atorvastatin, metoprolol and plavix  -hold metoprolol in s/o sepsis  -c/w home statin, plavix, and asa hx of STEMI in 2023 s/p stent on ASA, atorvastatin, metoprolol and plavix  -hold metoprolol in s/o sepsis  -c/w home statin, plavix, and asa  -elevated troponin peaked in ED, likely demand (Troponin 149.8->154-->149.4)  -EKG sinus tachycardia  -no chest pain or shortness of breath  -monitor on telemetry

## 2024-12-14 NOTE — H&P ADULT - NSHPPHYSICALEXAM_GEN_ALL_CORE
LOS:     VITALS:   T(C): 36.8 (12-14-24 @ 07:30), Max: 36.8 (12-14-24 @ 04:10)  HR: 114 (12-14-24 @ 07:30) (94 - 114)  BP: 102/62 (12-14-24 @ 07:30) (98/64 - 102/62)  RR: 17 (12-14-24 @ 07:30) (17 - 18)  SpO2: 99% (12-14-24 @ 07:30) (98% - 100%)    GENERAL: NAD, lying in bed comfortably  HEAD:  Atraumatic, Normocephalic  EYES: EOMI, PERRLA, conjunctiva and sclera clear  ENT: Moist mucous membranes, L sided lower facial/mandibular edema, otoscopic: normal b/l external ear canals with mild non-impacted cerumen, b/l tympanic membranes non-bulging, without effusion or erythema, L tympanic membrane scarring   NECK: Supple, no LAD, No JVD  CHEST/LUNG: Clear to auscultation bilaterally; No rales, rhonchi, wheezing, or rubs. Unlabored respirations  HEART: Tachycardic, regular rhythm; No murmurs, rubs, or gallops  ABDOMEN: BSx4; Soft, nontender, nondistended  EXTREMITIES:  2+ Peripheral Pulses, brisk capillary refill. No clubbing, cyanosis, or edema  NERVOUS SYSTEM:  A&Ox3, mild inducible nystagmus on rightward horizontal gaze, trace catch up saccades on VOR testing, no resting or vertical nystagmus, no dysmetria, normal RAHM b/l, borderline positive romberg test, notable generalized weakness 3+ to 4/5 strength in all four extremities, negative kernig and brudinski, no focal deficits   SKIN: No rashes or lesions

## 2024-12-14 NOTE — H&P ADULT - NSICDXPASTMEDICALHX_GEN_ALL_CORE_FT
PAST MEDICAL HISTORY:  Chronic otitis media     DM (diabetes mellitus)     Eustachian tube dysfunction, bilateral     HLD (hyperlipidemia)     HTN (hypertension)     STEMI (ST elevation myocardial infarction)

## 2024-12-15 LAB
ALBUMIN SERPL ELPH-MCNC: 2.9 G/DL — LOW (ref 3.5–5)
ALP SERPL-CCNC: 33 U/L — LOW (ref 40–120)
ALT FLD-CCNC: 33 U/L DA — SIGNIFICANT CHANGE UP (ref 10–60)
ANION GAP SERPL CALC-SCNC: 5 MMOL/L — SIGNIFICANT CHANGE UP (ref 5–17)
ANISOCYTOSIS BLD QL: SLIGHT — SIGNIFICANT CHANGE UP
AST SERPL-CCNC: 17 U/L — SIGNIFICANT CHANGE UP (ref 10–40)
BASOPHILS # BLD AUTO: 0.03 K/UL — SIGNIFICANT CHANGE UP (ref 0–0.2)
BASOPHILS NFR BLD AUTO: 0.3 % — SIGNIFICANT CHANGE UP (ref 0–2)
BILIRUB SERPL-MCNC: 0.5 MG/DL — SIGNIFICANT CHANGE UP (ref 0.2–1.2)
BLD GP AB SCN SERPL QL: SIGNIFICANT CHANGE UP
BUN SERPL-MCNC: 23 MG/DL — HIGH (ref 7–18)
CALCIUM SERPL-MCNC: 7.7 MG/DL — LOW (ref 8.4–10.5)
CHLORIDE SERPL-SCNC: 114 MMOL/L — HIGH (ref 96–108)
CO2 SERPL-SCNC: 25 MMOL/L — SIGNIFICANT CHANGE UP (ref 22–31)
CREAT SERPL-MCNC: 0.76 MG/DL — SIGNIFICANT CHANGE UP (ref 0.5–1.3)
EGFR: 117 ML/MIN/1.73M2 — SIGNIFICANT CHANGE UP
EOSINOPHIL # BLD AUTO: 0.08 K/UL — SIGNIFICANT CHANGE UP (ref 0–0.5)
EOSINOPHIL NFR BLD AUTO: 0.8 % — SIGNIFICANT CHANGE UP (ref 0–6)
GLUCOSE BLDC GLUCOMTR-MCNC: 116 MG/DL — HIGH (ref 70–99)
GLUCOSE BLDC GLUCOMTR-MCNC: 129 MG/DL — HIGH (ref 70–99)
GLUCOSE BLDC GLUCOMTR-MCNC: 137 MG/DL — HIGH (ref 70–99)
GLUCOSE BLDC GLUCOMTR-MCNC: 83 MG/DL — SIGNIFICANT CHANGE UP (ref 70–99)
GLUCOSE SERPL-MCNC: 104 MG/DL — HIGH (ref 70–99)
HCT VFR BLD CALC: 18.3 % — CRITICAL LOW (ref 39–50)
HCT VFR BLD CALC: 18.3 % — CRITICAL LOW (ref 39–50)
HCT VFR BLD CALC: 30.3 % — LOW (ref 39–50)
HCT VFR BLD CALC: 30.8 % — LOW (ref 39–50)
HGB BLD-MCNC: 10.6 G/DL — LOW (ref 13–17)
HGB BLD-MCNC: 10.6 G/DL — LOW (ref 13–17)
HGB BLD-MCNC: 6.2 G/DL — CRITICAL LOW (ref 13–17)
HGB BLD-MCNC: 6.3 G/DL — CRITICAL LOW (ref 13–17)
HYPOCHROMIA BLD QL: SLIGHT — SIGNIFICANT CHANGE UP
IMM GRANULOCYTES NFR BLD AUTO: 0.6 % — SIGNIFICANT CHANGE UP (ref 0–0.9)
IRON SATN MFR SERPL: 16 % — LOW (ref 20–55)
IRON SATN MFR SERPL: 45 UG/DL — LOW (ref 65–170)
LACTATE SERPL-SCNC: 1.5 MMOL/L — SIGNIFICANT CHANGE UP (ref 0.7–2)
LYMPHOCYTES # BLD AUTO: 3.56 K/UL — HIGH (ref 1–3.3)
LYMPHOCYTES # BLD AUTO: 35.4 % — SIGNIFICANT CHANGE UP (ref 13–44)
MAGNESIUM SERPL-MCNC: 2.2 MG/DL — SIGNIFICANT CHANGE UP (ref 1.6–2.6)
MANUAL SMEAR VERIFICATION: SIGNIFICANT CHANGE UP
MCHC RBC-ENTMCNC: 30.2 PG — SIGNIFICANT CHANGE UP (ref 27–34)
MCHC RBC-ENTMCNC: 30.5 PG — SIGNIFICANT CHANGE UP (ref 27–34)
MCHC RBC-ENTMCNC: 30.7 PG — SIGNIFICANT CHANGE UP (ref 27–34)
MCHC RBC-ENTMCNC: 30.9 PG — SIGNIFICANT CHANGE UP (ref 27–34)
MCHC RBC-ENTMCNC: 33.9 G/DL — SIGNIFICANT CHANGE UP (ref 32–36)
MCHC RBC-ENTMCNC: 34.4 G/DL — SIGNIFICANT CHANGE UP (ref 32–36)
MCHC RBC-ENTMCNC: 34.4 G/DL — SIGNIFICANT CHANGE UP (ref 32–36)
MCHC RBC-ENTMCNC: 35 G/DL — SIGNIFICANT CHANGE UP (ref 32–36)
MCV RBC AUTO: 87.7 FL — SIGNIFICANT CHANGE UP (ref 80–100)
MCV RBC AUTO: 87.8 FL — SIGNIFICANT CHANGE UP (ref 80–100)
MCV RBC AUTO: 89.7 FL — SIGNIFICANT CHANGE UP (ref 80–100)
MCV RBC AUTO: 90.1 FL — SIGNIFICANT CHANGE UP (ref 80–100)
MONOCYTES # BLD AUTO: 0.75 K/UL — SIGNIFICANT CHANGE UP (ref 0–0.9)
MONOCYTES NFR BLD AUTO: 7.4 % — SIGNIFICANT CHANGE UP (ref 2–14)
NEUTROPHILS # BLD AUTO: 5.59 K/UL — SIGNIFICANT CHANGE UP (ref 1.8–7.4)
NEUTROPHILS NFR BLD AUTO: 55.5 % — SIGNIFICANT CHANGE UP (ref 43–77)
NRBC # BLD: 0 /100 WBCS — SIGNIFICANT CHANGE UP (ref 0–0)
PHOSPHATE SERPL-MCNC: 1.9 MG/DL — LOW (ref 2.5–4.5)
PHOSPHATE SERPL-MCNC: 1.9 MG/DL — LOW (ref 2.5–4.5)
PHOSPHATE SERPL-MCNC: 2.2 MG/DL — LOW (ref 2.5–4.5)
PLAT MORPH BLD: NORMAL — SIGNIFICANT CHANGE UP
PLATELET # BLD AUTO: 125 K/UL — LOW (ref 150–400)
PLATELET # BLD AUTO: 141 K/UL — LOW (ref 150–400)
PLATELET # BLD AUTO: 147 K/UL — LOW (ref 150–400)
PLATELET # BLD AUTO: 150 K/UL — SIGNIFICANT CHANGE UP (ref 150–400)
PLATELET COUNT - ESTIMATE: ABNORMAL
POIKILOCYTOSIS BLD QL AUTO: SLIGHT — SIGNIFICANT CHANGE UP
POLYCHROMASIA BLD QL SMEAR: SLIGHT — SIGNIFICANT CHANGE UP
POTASSIUM SERPL-MCNC: 3.9 MMOL/L — SIGNIFICANT CHANGE UP (ref 3.5–5.3)
POTASSIUM SERPL-SCNC: 3.9 MMOL/L — SIGNIFICANT CHANGE UP (ref 3.5–5.3)
PROT SERPL-MCNC: 5 G/DL — LOW (ref 6–8.3)
RBC # BLD: 2.03 M/UL — LOW (ref 4.2–5.8)
RBC # BLD: 2.04 M/UL — LOW (ref 4.2–5.8)
RBC # BLD: 2.14 M/UL — LOW (ref 4.2–5.8)
RBC # BLD: 3.45 M/UL — LOW (ref 4.2–5.8)
RBC # BLD: 3.51 M/UL — LOW (ref 4.2–5.8)
RBC # FLD: 13.9 % — SIGNIFICANT CHANGE UP (ref 10.3–14.5)
RBC # FLD: 14 % — SIGNIFICANT CHANGE UP (ref 10.3–14.5)
RBC # FLD: 14.3 % — SIGNIFICANT CHANGE UP (ref 10.3–14.5)
RBC # FLD: 14.5 % — SIGNIFICANT CHANGE UP (ref 10.3–14.5)
RBC BLD AUTO: ABNORMAL
RETICS #: 144.9 K/UL — HIGH (ref 25–125)
RETICS/RBC NFR: 6.8 % — HIGH (ref 0.5–2.5)
SODIUM SERPL-SCNC: 144 MMOL/L — SIGNIFICANT CHANGE UP (ref 135–145)
TIBC SERPL-MCNC: 276 UG/DL — SIGNIFICANT CHANGE UP (ref 250–450)
UIBC SERPL-MCNC: 231 UG/DL — SIGNIFICANT CHANGE UP (ref 110–370)
VANCOMYCIN TROUGH SERPL-MCNC: 5.6 UG/ML — LOW (ref 10–20)
WBC # BLD: 10.07 K/UL — SIGNIFICANT CHANGE UP (ref 3.8–10.5)
WBC # BLD: 10.6 K/UL — HIGH (ref 3.8–10.5)
WBC # BLD: 11.59 K/UL — HIGH (ref 3.8–10.5)
WBC # BLD: 8.94 K/UL — SIGNIFICANT CHANGE UP (ref 3.8–10.5)
WBC # FLD AUTO: 10.07 K/UL — SIGNIFICANT CHANGE UP (ref 3.8–10.5)
WBC # FLD AUTO: 10.6 K/UL — HIGH (ref 3.8–10.5)
WBC # FLD AUTO: 11.59 K/UL — HIGH (ref 3.8–10.5)
WBC # FLD AUTO: 8.94 K/UL — SIGNIFICANT CHANGE UP (ref 3.8–10.5)

## 2024-12-15 PROCEDURE — 99223 1ST HOSP IP/OBS HIGH 75: CPT

## 2024-12-15 RX ORDER — PANTOPRAZOLE SODIUM 40 MG/1
40 TABLET, DELAYED RELEASE ORAL EVERY 12 HOURS
Refills: 0 | Status: DISCONTINUED | OUTPATIENT
Start: 2024-12-15 | End: 2024-12-16

## 2024-12-15 RX ORDER — SODIUM CHLORIDE 9 MG/ML
1000 INJECTION, SOLUTION INTRAMUSCULAR; INTRAVENOUS; SUBCUTANEOUS
Refills: 0 | Status: DISCONTINUED | OUTPATIENT
Start: 2024-12-15 | End: 2024-12-16

## 2024-12-15 RX ORDER — POTASSIUM PHOSPHATE, MONOBASIC POTASSIUM PHOSPHATE, DIBASIC INJECTION, 236; 224 MG/ML; MG/ML
15 SOLUTION, CONCENTRATE INTRAVENOUS ONCE
Refills: 0 | Status: COMPLETED | OUTPATIENT
Start: 2024-12-15 | End: 2024-12-15

## 2024-12-15 RX ADMIN — PANTOPRAZOLE SODIUM 40 MILLIGRAM(S): 40 TABLET, DELAYED RELEASE ORAL at 22:37

## 2024-12-15 RX ADMIN — Medication 40 MILLIGRAM(S): at 22:31

## 2024-12-15 RX ADMIN — Medication 250 MILLIGRAM(S): at 06:30

## 2024-12-15 RX ADMIN — Medication 250 MILLIGRAM(S): at 20:21

## 2024-12-15 RX ADMIN — Medication 0: at 22:33

## 2024-12-15 RX ADMIN — PANTOPRAZOLE SODIUM 40 MILLIGRAM(S): 40 TABLET, DELAYED RELEASE ORAL at 10:52

## 2024-12-15 RX ADMIN — POTASSIUM PHOSPHATE, MONOBASIC POTASSIUM PHOSPHATE, DIBASIC INJECTION, 62.5 MILLIMOLE(S): 236; 224 SOLUTION, CONCENTRATE INTRAVENOUS at 23:50

## 2024-12-15 RX ADMIN — SODIUM CHLORIDE 60 MILLILITER(S): 9 INJECTION, SOLUTION INTRAMUSCULAR; INTRAVENOUS; SUBCUTANEOUS at 10:55

## 2024-12-15 RX ADMIN — CEFEPIME 100 MILLIGRAM(S): 2 INJECTION, POWDER, FOR SOLUTION INTRAVENOUS at 22:37

## 2024-12-15 RX ADMIN — CEFEPIME 100 MILLIGRAM(S): 2 INJECTION, POWDER, FOR SOLUTION INTRAVENOUS at 06:30

## 2024-12-15 NOTE — PROGRESS NOTE ADULT - SUBJECTIVE AND OBJECTIVE BOX
Date of Service 12-15-24 @ 10:29    CHIEF COMPLAINT:Patient is a 39y old  Male who presents with a chief complaint of acute vertigo w/ severe sepsis.Pt had melena yesterday.    	  REVIEW OF SYSTEMS:  CONSTITUTIONAL: No fever, weight loss, or fatigue  EYES: No eye pain, visual disturbances, or discharge  ENT:  No difficulty hearing, tinnitus, vertigo; No sinus or throat pain  NECK: No pain or stiffness  RESPIRATORY: No cough, wheezing, chills or hemoptysis; No Shortness of Breath  CARDIOVASCULAR: No chest pain, palpitations, passing out, dizziness, or leg swelling  GASTROINTESTINAL: No abdominal or epigastric pain. No nausea, vomiting, or hematemesis; No diarrhea or constipation. No melena or hematochezia.  GENITOURINARY: No dysuria, frequency, hematuria, or incontinence  NEUROLOGICAL: No headaches, memory loss, loss of strength, numbness, or tremors  SKIN: No itching, burning, rashes, or lesions   LYMPH Nodes: No enlarged glands  ENDOCRINE: No heat or cold intolerance; No hair loss  MUSCULOSKELETAL: No joint pain or swelling; No muscle, back, or extremity pain  PSYCHIATRIC: No depression, anxiety, mood swings, or difficulty sleeping  HEME/LYMPH: No easy bruising, or bleeding gums  ALLERGY AND IMMUNOLOGIC: No hives or eczema	      PHYSICAL EXAM:  T(C): 36.3 (12-15-24 @ 10:11), Max: 36.9 (12-14-24 @ 11:40)  HR: 109 (12-15-24 @ 10:11) (82 - 110)  BP: 106/60 (12-15-24 @ 10:11) (87/49 - 106/60)  RR: 18 (12-15-24 @ 10:11) (17 - 18)  SpO2: 99% (12-15-24 @ 10:11) (99% - 100%)  Wt(kg): --  I&O's Summary      Appearance: Normal	  HEENT:   Normal oral mucosa, PERRL, EOMI	  Lymphatic: No lymphadenopathy  Cardiovascular: Normal S1 S2, No JVD, No murmurs, No edema  Respiratory: Lungs clear to auscultation	  Psychiatry: A & O x 3, Mood & affect appropriate  Gastrointestinal:  Soft, Non-tender, + BS	  Skin: No rashes, No ecchymoses, No cyanosis	  Neurologic: Non-focal  Extremities: Normal range of motion, No clubbing, cyanosis or edema  Vascular: Peripheral pulses palpable 2+ bilaterally    MEDICATIONS  (STANDING):  aspirin  chewable 81 milliGRAM(s) Oral daily  atorvastatin 40 milliGRAM(s) Oral at bedtime  cefepime   IVPB      cefepime   IVPB 1000 milliGRAM(s) IV Intermittent every 8 hours  influenza   Vaccine 0.5 milliLiter(s) IntraMuscular once  insulin lispro (ADMELOG) corrective regimen sliding scale   SubCutaneous three times a day before meals  insulin lispro (ADMELOG) corrective regimen sliding scale   SubCutaneous at bedtime  vancomycin  IVPB 1000 milliGRAM(s) IV Intermittent every 12 hours        LABS:	 	  CARDIAC MARKERS ( 14 Dec 2024 05:00 )  x     / x     / x     / x     / 3.1 ng/mL      Troponin I, High Sensitivity Result: 149.8 ng/L (12-14 @ 05:00)  Troponin I, High Sensitivity Result: 154.7 ng/L (12-14 @ 00:35)  Troponin I, High Sensitivity Result: 149.4 ng/L (12-13 @ 23:10)                            6.2    8.94  )-----------( 125      ( 15 Dec 2024 08:14 )             18.3     12-15    144  |  114[H]  |  23[H]  ----------------------------<  104[H]  3.9   |  25  |  0.76    Ca    7.7[L]      15 Dec 2024 07:09  Phos  1.9     12-15  Mg     2.2     12-15    TPro  5.0[L]  /  Alb  2.9[L]  /  TBili  0.5  /  DBili  x   /  AST  17  /  ALT  33  /  AlkPhos  33[L]  12-15    proBNP:   Lipid Profile:   HgA1c:   TSH: Thyroid Stimulating Hormone, Serum: 1.99 uU/mL (12-14 @ 05:00)

## 2024-12-15 NOTE — CONSULT NOTE ADULT - ASSESSMENT
39y M with PMH of chronic b/l otitis media, STEMI (2023) s/p stent, HLD, HTN, and DM presenting with vertigo, left ear tinnitus/fullness, and generalized weakness. Hb drop 4 g, also dark stool. On PPI IV BID. 2 U PRBC ordered, 1 completed.   Plan   NPO after midnight  EGD tomorrow  agree with holding A/C for now   39y M with PMH of chronic b/l otitis media, STEMI (2023 on ASA/plavix since Nov 2023) s/p stent, HLD, HTN, and DM presenting with vertigo, left ear tinnitus/fullness, and generalized weakness. Hb drop 4 g, also dark stool. On PPI IV BID. 2 U PRBC ordered, 1 completed. Concern for UGIB, unclear why now - pt denies NSAID use and has been on ASA/plavix >1 y.  Plan   NPO after midnight but has been NPO all day, please allow clears for dinner  EGD tomorrow  agree with holding A/C for now

## 2024-12-15 NOTE — PROGRESS NOTE ADULT - SUBJECTIVE AND OBJECTIVE BOX
HPI:  39y M with PMH of chronic b/l otitis media, STEMI (2023) s/p stent, HLD, HTN, and DM presenting with vertigo, left ear tinnitus/fullness, and generalized weakness. Patient states that yesterday he started feeling "bad", described as generalized weakness, and later suddenly developed dizziness described as head spinning while standing up in the bathroom. Says he immediately laid down on the floor and felt better. After lying on the floor for a few minutes he stood up but says he quickly developed the same head spinning sensation and instability. Says he has never experienced similar symptoms in the past and was worried symptoms were related to his heart given recent STEMI so he came to the ED. Endorses a ringing in his left ear as well as feeling of fullness in the left ear since yesterday however denies pain or discharge from the ear. Uses hearing aide in L ear only.  Patient has a history of frequent ear infections and states that in the past they have been associated pain and and fluid leaking from the ears. Per prior ENT notes in [facility] patient has known bilateral eustachian tube dysfunction and has been offered balloon dilation procedure in the past. Patient denies fever, chills, headache, chest pain, palpitations, shortness of breath, abdominal pain, nausea, vomiting, diarrhea, constipation, and dysuria. When asked if he thought his left face was swollen he said he had not noticed but when showed a picture of himself on cell phone he agreed that the left cheek and jaw appears more swollen than baseline.    (14 Dec 2024 06:53)      Patient is a 39y old  Male who presents with a chief complaint of acute vertigo w/ severe sepsis (15 Dec 2024 15:38)      INTERVAL HPI/OVERNIGHT EVENTS:  T(C): 36.6 (12-15-24 @ 16:27), Max: 37.1 (12-15-24 @ 11:32)  HR: 69 (12-15-24 @ 16:27) (69 - 110)  BP: 104/58 (12-15-24 @ 16:27) (87/49 - 106/60)  RR: 16 (12-15-24 @ 16:27) (16 - 18)  SpO2: 99% (12-15-24 @ 16:27) (96% - 100%)  Wt(kg): --  I&O's Summary      REVIEW OF SYSTEMS: denies fever, chills, SOB, palpitations, chest pain, abdominal pain, nausea, vomitting, diarrhea, constipation, dizziness    MEDICATIONS  (STANDING):  atorvastatin 40 milliGRAM(s) Oral at bedtime  cefepime   IVPB      cefepime   IVPB 1000 milliGRAM(s) IV Intermittent every 8 hours  influenza   Vaccine 0.5 milliLiter(s) IntraMuscular once  insulin lispro (ADMELOG) corrective regimen sliding scale   SubCutaneous three times a day before meals  insulin lispro (ADMELOG) corrective regimen sliding scale   SubCutaneous at bedtime  pantoprazole  Injectable 40 milliGRAM(s) IV Push every 12 hours  potassium phosphate IVPB 15 milliMole(s) IV Intermittent once  sodium chloride 0.9%. 1000 milliLiter(s) (60 mL/Hr) IV Continuous <Continuous>  vancomycin  IVPB 1000 milliGRAM(s) IV Intermittent every 12 hours    MEDICATIONS  (PRN):  acetaminophen     Tablet .. 650 milliGRAM(s) Oral every 6 hours PRN Temp greater or equal to 38C (100.4F), Mild Pain (1 - 3)  meclizine 25 milliGRAM(s) Oral every 8 hours PRN Dizziness  ondansetron Injectable 4 milliGRAM(s) IV Push every 8 hours PRN Nausea and/or Vomiting      PHYSICAL EXAM:  GENERAL: NAD, well-groomed, well-developed  HEAD:  Atraumatic, Normocephalic  EYES: EOMI, PERRLA, conjunctiva and sclera clear  ENMT: No tonsillar erythema, exudates, or enlargement; Moist mucous membranes, Good dentition, No lesions  NECK: Supple, No JVD, Normal thyroid  NERVOUS SYSTEM:  Alert & Oriented X3, Good concentration; Motor Strength 5/5 B/L upper and lower extremities; DTRs 2+ intact and symmetric  CHEST/LUNG: Clear to percussion bilaterally; No rales, rhonchi, wheezing, or rubs  HEART: Regular rate and rhythm; No murmurs, rubs, or gallops  ABDOMEN: Soft, Nontender, Nondistended; Bowel sounds present  EXTREMITIES:  2+ Peripheral Pulses, No clubbing, cyanosis, or edema  LYMPH: No lymphadenopathy noted  SKIN: No rashes or lesions  LABS:                        6.2    8.94  )-----------( 125      ( 15 Dec 2024 08:14 )             18.3     12-15    144  |  114[H]  |  23[H]  ----------------------------<  104[H]  3.9   |  25  |  0.76    Ca    7.7[L]      15 Dec 2024 07:09  Phos  1.9     12-15  Mg     2.2     12-15    TPro  5.0[L]  /  Alb  2.9[L]  /  TBili  0.5  /  DBili  x   /  AST  17  /  ALT  33  /  AlkPhos  33[L]  12-15    PT/INR - ( 14 Dec 2024 05:00 )   PT: 13.3 sec;   INR: 1.15 ratio         PTT - ( 14 Dec 2024 05:00 )  PTT:24.2 sec  Urinalysis Basic - ( 15 Dec 2024 07:09 )    Color: x / Appearance: x / SG: x / pH: x  Gluc: 104 mg/dL / Ketone: x  / Bili: x / Urobili: x   Blood: x / Protein: x / Nitrite: x   Leuk Esterase: x / RBC: x / WBC x   Sq Epi: x / Non Sq Epi: x / Bacteria: x      CAPILLARY BLOOD GLUCOSE      POCT Blood Glucose.: 129 mg/dL (15 Dec 2024 11:34)  POCT Blood Glucose.: 116 mg/dL (15 Dec 2024 07:47)  POCT Blood Glucose.: 177 mg/dL (14 Dec 2024 21:39)  POCT Blood Glucose.: 95 mg/dL (14 Dec 2024 17:06)        Urinalysis Basic - ( 15 Dec 2024 07:09 )    Color: x / Appearance: x / SG: x / pH: x  Gluc: 104 mg/dL / Ketone: x  / Bili: x / Urobili: x   Blood: x / Protein: x / Nitrite: x   Leuk Esterase: x / RBC: x / WBC x   Sq Epi: x / Non Sq Epi: x / Bacteria: x

## 2024-12-15 NOTE — PROGRESS NOTE ADULT - ASSESSMENT
Patient is a 39y old  Male with PMH of chronic b/l otitis media, STEMI (2023) s/p stent, HLD, HTN, and DM, now presents to the ER for evaluation of acute dizziness. left ear tinnitus/fullness, and generalized weakness. On admission, he found to have no fever but tachycardia and Leukocytosis. The CXR  and urine analysis is negative, blood cultures and CT scan of ear/head is pending. He has started on Zosyn and IV Vancomycin, and the ID consult requested to assist with further evaluation and antibiotic management,    # Sepsis ( Tachycardia + Leukocytosis)   # Probable Left otitis media- CT scan is negative  # Acute blood loss    would recommend     1. Monitor H/H and transfuse as needed and GI evaluation   2. Follow up Blood cultures and CT scan of  Ears  3. Continue Cefepime and discontinue IV Vancomycin if Blood cultures are negative  4. Fall precautions    d/w Patient and Covering NP, Gage    Attending Attestation:    Spent more than 45 minutes on total encounter, more than 50 % of the visit was spent counseling and/or coordinating care by the Attending physician.

## 2024-12-15 NOTE — CHART NOTE - NSCHARTNOTEFT_GEN_A_CORE
Called by bedside RN for critical value, Hgb of 6.3.    Given sharp contrast to prior lab value, this was repeated.  Repeat Hgb was 6.2.    Pt seen and examined.  He reports having black stool yesterday in hospital that he flushed, he did not tell anyone.  Does not report any BRBPR or black stools at home.  Does report some green stools that he did not think much of.  On ASA/plavix since CAD PCI 11/9/2023 done at [facility].    ICU Vital Signs Last 24 Hrs  T(C): 36.3 (15 Dec 2024 10:11), Max: 36.9 (14 Dec 2024 11:40)  T(F): 97.4 (15 Dec 2024 10:11), Max: 98.4 (14 Dec 2024 11:40)  HR: 109 (15 Dec 2024 10:11) (82 - 110)  BP: 106/60 (15 Dec 2024 10:11) (87/49 - 106/60)  BP(mean): 64 (15 Dec 2024 06:41) (62 - 68)  RR: 18 (15 Dec 2024 10:11) (17 - 18)  SpO2: 99% (15 Dec 2024 10:11) (99% - 100%)    O2 Parameters below as of 15 Dec 2024 10:11  Patient On (Oxygen Delivery Method): room air                          6.2    8.94  )-----------( 125      ( 15 Dec 2024 08:14 )             18.3     12-15    144  |  114[H]  |  23[H]  ----------------------------<  104[H]  3.9   |  25  |  0.76    Ca    7.7[L]      15 Dec 2024 07:09  Phos  1.9     12-15  Mg     2.2     12-15    TPro  5.0[L]  /  Alb  2.9[L]  /  TBili  0.5  /  DBili  x   /  AST  17  /  ALT  33  /  AlkPhos  33[L]  12-15    1) GI Bleed: Suspect upper.  -Type and screen done, consented for blood transfusion.  -Stop ASA/plavix as discussed with Cardiology, PCI was over a year ago.  -Check iron labs and retic count  -NPO  -CT A/P with oral contrast  -GI Consulted    Gage Luna NP Called by bedside RN for critical value, Hgb of 6.3.    Given sharp contrast to prior lab value, this was repeated.  Repeat Hgb was 6.2.    Pt seen and examined.  He reports having black stool yesterday in hospital that he flushed, he did not tell anyone.  Does not report any BRBPR or black stools at home.  Does report some green stools that he did not think much of.  On ASA/plavix since CAD PCI 11/9/2023 done at [facility].    ICU Vital Signs Last 24 Hrs  T(C): 36.3 (15 Dec 2024 10:11), Max: 36.9 (14 Dec 2024 11:40)  T(F): 97.4 (15 Dec 2024 10:11), Max: 98.4 (14 Dec 2024 11:40)  HR: 109 (15 Dec 2024 10:11) (82 - 110)  BP: 106/60 (15 Dec 2024 10:11) (87/49 - 106/60)  BP(mean): 64 (15 Dec 2024 06:41) (62 - 68)  RR: 18 (15 Dec 2024 10:11) (17 - 18)  SpO2: 99% (15 Dec 2024 10:11) (99% - 100%)    O2 Parameters below as of 15 Dec 2024 10:11  Patient On (Oxygen Delivery Method): room air    AAOx3, NAD  S1, S2, rrr, no MGR  Lungs CTA b/l  +BS abd s/nt/nd s baljit/guarding  No LE edema  b/l palms pale                          6.2    8.94  )-----------( 125      ( 15 Dec 2024 08:14 )             18.3     12-15    144  |  114[H]  |  23[H]  ----------------------------<  104[H]  3.9   |  25  |  0.76    Ca    7.7[L]      15 Dec 2024 07:09  Phos  1.9     12-15  Mg     2.2     12-15    TPro  5.0[L]  /  Alb  2.9[L]  /  TBili  0.5  /  DBili  x   /  AST  17  /  ALT  33  /  AlkPhos  33[L]  12-15    1) GI Bleed: Suspect upper.  -Type and screen done, consented for blood transfusion.  -Stop ASA/plavix as discussed with Cardiology, PCI was over a year ago.  -Check iron labs and retic count  -NPO  -CT A/P with oral contrast  -GI Consulted    Gage Luna NP

## 2024-12-15 NOTE — PROGRESS NOTE ADULT - ASSESSMENT
39y M with PMH of chronic b/l otitis media, STEMI (2023) s/p stent at [facility] , HLD, HTN, and DM presenting with vertigo, left ear tinnitus/fullness, and generalized weakness. Patient states that yesterday he started feeling "bad", described as generalized weakness, and later suddenly developed dizziness described as head spinning while standing up in the bathroom,sespsis. and mild elevated troponins,now GI bleed with acute anemia  1.Sepsis-s/p pan cx,abx started.  2.CAD-D/C asa, plavix,cont statin-hold b blocker.  3.GI bleed with acute anemia-NPO,IVF,GI eval,transfuse 2 PRBC.CT abd and pelvis with oral contrast.  4.DM-Insulin, check A1c.  5.Lipid d/o-statin.  6.NPO,IVF.  7.Echocardiogram.  8.PPI.

## 2024-12-15 NOTE — PROGRESS NOTE ADULT - ASSESSMENT
seen and examined vsstable afebrile physical done     pt was admitted for dizzyness tinnitus    today in amd pt had dark stool and hgb drop from 10 to 6  pt was seen by NP Txc and started on PRBC  sofar 1 unit given   pt denies any abd pain  denies taking any pain meds  no motrin advil or any other NSAID     pt has h/o CAD STENT in NOV 2023  on plavix  ( is over 1 year  still on)    a/p GI asap   one more PRBC  and cbc repeat  d/w NP if hgb going down then ICU   GI saw pt but note pending    card on case   watch bs

## 2024-12-15 NOTE — CONSULT NOTE ADULT - SUBJECTIVE AND OBJECTIVE BOX
Chief Complaint:  Patient is a 39y old  Male who presents with a chief complaint of acute vertigo w/ severe sepsis (15 Dec 2024 10:29)            HPI:  39y M with PMH of chronic b/l otitis media, STEMI (2023) s/p stent, HLD, HTN, and DM presenting with vertigo, left ear tinnitus/fullness, and generalized weakness. Endorses a ringing in his left ear as well as feeling of fullness in the left ear since yesterday however denies pain or discharge from the ear. Uses hearing aid in L ear only.  Patient has a history of frequent ear infections and states that in the past they have been associated pain and and fluid leaking from the ears. Per prior ENT notes in HIE patient has known bilateral eustachian tube dysfunction and has been offered balloon dilation procedure in the past.    GI consult for dark stools concern for melena. Hb 10 yesterday now 6, confirmed on repeat. No prior baseline. Elevated BUN 40s initially now 20s. Albumin 2.9. No renal dysfunction. CT head no acute pathology. No EGD or colon in system. No abdominal imaging either.         PAST MEDICAL & SURGICAL HISTORY:  Chronic otitis media      Eustachian tube dysfunction, bilateral      STEMI (ST elevation myocardial infarction)      HTN (hypertension)      HLD (hyperlipidemia)      DM (diabetes mellitus)      S/P coronary artery stent placement          REVIEW OF SYSTEMS:   General: Negative  HEENT: Negative  CV: Negative  Respiratory: Negative  GI: See HPI  : Negative  MSK: Negative  Hematologic: Negative  Skin: Negative    MEDICATIONS:   MEDICATIONS  (STANDING):  atorvastatin 40 milliGRAM(s) Oral at bedtime  cefepime   IVPB      cefepime   IVPB 1000 milliGRAM(s) IV Intermittent every 8 hours  influenza   Vaccine 0.5 milliLiter(s) IntraMuscular once  insulin lispro (ADMELOG) corrective regimen sliding scale   SubCutaneous three times a day before meals  insulin lispro (ADMELOG) corrective regimen sliding scale   SubCutaneous at bedtime  pantoprazole  Injectable 40 milliGRAM(s) IV Push every 12 hours  potassium phosphate IVPB 15 milliMole(s) IV Intermittent once  sodium chloride 0.9%. 1000 milliLiter(s) (60 mL/Hr) IV Continuous <Continuous>  vancomycin  IVPB 1000 milliGRAM(s) IV Intermittent every 12 hours    MEDICATIONS  (PRN):  acetaminophen     Tablet .. 650 milliGRAM(s) Oral every 6 hours PRN Temp greater or equal to 38C (100.4F), Mild Pain (1 - 3)  meclizine 25 milliGRAM(s) Oral every 8 hours PRN Dizziness  ondansetron Injectable 4 milliGRAM(s) IV Push every 8 hours PRN Nausea and/or Vomiting      Packed Red Cells Order:  1 Unit  Indication: Hgb <8 gm/dL -Stable Cardiovascular Disease or Acute Co  Infuse Unit : 2.5 Hours (12-15-24 @ 10:30)  Packed Red Cells Order:  1 Unit  Indication: Hgb <8 gm/dL -Stable Cardiovascular Disease or Acute Co  Infuse Unit : 2.5 Hours (12-15-24 @ 10:30)      DIET:  Diet, NPO:   Except Medications  With Ice Chips/Sips of Water (12-15-24 @ 10:34) [Active]          ALLERGIES:   Allergies    No Known Allergies    Intolerances        Substance Use:   (  ) never used  (  ) other:  Tobacco Usage:  (   ) never smoked   (   ) former smoker   (   ) current smoker  (     ) pack year  (        ) last cigarette date  Alcohol Usage:    Family History   IBD (  ) Yes   (  ) No  GI Malignancy (  )  Yes    (  ) No    Health Management  Last Colonoscopy:  Last Endoscopy:     VITAL SIGNS:   Vital Signs Last 24 Hrs  T(C): 36.7 (15 Dec 2024 14:20), Max: 37.1 (15 Dec 2024 11:32)  T(F): 98.1 (15 Dec 2024 14:20), Max: 98.7 (15 Dec 2024 11:32)  HR: 93 (15 Dec 2024 14:20) (82 - 110)  BP: 104/64 (15 Dec 2024 14:20) (87/49 - 106/60)  BP(mean): 64 (15 Dec 2024 06:41) (62 - 68)  RR: 16 (15 Dec 2024 14:20) (16 - 18)  SpO2: 98% (15 Dec 2024 14:20) (98% - 100%)    Parameters below as of 15 Dec 2024 14:20  Patient On (Oxygen Delivery Method): room air      I&O's Summary      PHYSICAL EXAM:   GENERAL:  No acute distress  HEENT:  NC/AT, conjunctiva clear, sclera anicteric  CHEST:  No increased effort  HEART:  Regular rate  ABDOMEN:  Soft, non-tender, non-distended, normoactive bowel sounds, no rebound or guarding  EXTREMITIES: No edema  SKIN:  Warm, dry  NEURO:  Calm, cooperative    LABS:                        6.2    8.94  )-----------( 125      ( 15 Dec 2024 08:14 )             18.3     Hemoglobin: 6.2 g/dL (12-15-24 @ 08:14)  Hemoglobin: 6.3 g/dL (12-15-24 @ 07:09)  Hemoglobin: 10.0 g/dL (12-14-24 @ 05:00)  Hemoglobin: 11.0 g/dL (12-13-24 @ 23:10)    12-15    144  |  114[H]  |  23[H]  ----------------------------<  104[H]  3.9   |  25  |  0.76    Ca    7.7[L]      15 Dec 2024 07:09  Phos  1.9     12-15  Mg     2.2     12-15    TPro  5.0[L]  /  Alb  2.9[L]  /  TBili  0.5  /  DBili  x   /  AST  17  /  ALT  33  /  AlkPhos  33[L]  12-15    LIVER FUNCTIONS - ( 15 Dec 2024 07:09 )  Alb: 2.9 g/dL / Pro: 5.0 g/dL / ALK PHOS: 33 U/L / ALT: 33 U/L DA / AST: 17 U/L / GGT: x             PT/INR - ( 14 Dec 2024 05:00 )   PT: 13.3 sec;   INR: 1.15 ratio         PTT - ( 14 Dec 2024 05:00 )  PTT:24.2 sec    Urinalysis with Rflx Culture (collected 14 Dec 2024 05:00)    Culture - Blood (collected 14 Dec 2024 05:00)  Source: .Blood BLOOD  Preliminary Report (15 Dec 2024 11:01):    No growth at 24 hours    Culture - Blood (collected 14 Dec 2024 04:50)  Source: .Blood BLOOD  Preliminary Report (15 Dec 2024 11:01):    No growth at 24 hours                                        RADIOLOGY & ADDITIONAL STUDIES:         Chief Complaint:  Patient is a 39y old  Male who presents with a chief complaint of acute vertigo w/ severe sepsis (15 Dec 2024 10:29)            HPI:39y M with PMH of chronic b/l otitis media, STEMI (2023) s/p stent, HLD, HTN, and DM presenting with vertigo, left ear tinnitus/fullness, and generalized weakness. Endorses a ringing in his left ear as well as feeling of fullness in the left ear since yesterday however denies pain or discharge from the ear. Uses hearing aid in L ear only.  Patient has a history of frequent ear infections and states that in the past they have been associated pain and and fluid leaking from the ears. Per prior ENT notes in HIE patient has known bilateral eustachian tube dysfunction and has been offered balloon dilation procedure in the past.    GI consult for dark stools concern for melena. Hb 10 yesterday now 6, confirmed on repeat. No prior baseline. Elevated BUN 40s initially now 20s. Albumin 2.9. No renal dysfunction. CT head no acute pathology. No EGD or colon ever.  No abdominal imaging either.         PAST MEDICAL & SURGICAL HISTORY:  Chronic otitis media      Eustachian tube dysfunction, bilateral      STEMI (ST elevation myocardial infarction)      HTN (hypertension)      HLD (hyperlipidemia)      DM (diabetes mellitus)      S/P coronary artery stent placement          REVIEW OF SYSTEMS:   General: Negative  HEENT: Negative  CV: Negative  Respiratory: Negative  GI: See HPI  : Negative  MSK: Negative  Hematologic: Negative  Skin: Negative    MEDICATIONS:   MEDICATIONS  (STANDING):  atorvastatin 40 milliGRAM(s) Oral at bedtime  cefepime   IVPB      cefepime   IVPB 1000 milliGRAM(s) IV Intermittent every 8 hours  influenza   Vaccine 0.5 milliLiter(s) IntraMuscular once  insulin lispro (ADMELOG) corrective regimen sliding scale   SubCutaneous three times a day before meals  insulin lispro (ADMELOG) corrective regimen sliding scale   SubCutaneous at bedtime  pantoprazole  Injectable 40 milliGRAM(s) IV Push every 12 hours  potassium phosphate IVPB 15 milliMole(s) IV Intermittent once  sodium chloride 0.9%. 1000 milliLiter(s) (60 mL/Hr) IV Continuous <Continuous>  vancomycin  IVPB 1000 milliGRAM(s) IV Intermittent every 12 hours    MEDICATIONS  (PRN):  acetaminophen     Tablet .. 650 milliGRAM(s) Oral every 6 hours PRN Temp greater or equal to 38C (100.4F), Mild Pain (1 - 3)  meclizine 25 milliGRAM(s) Oral every 8 hours PRN Dizziness  ondansetron Injectable 4 milliGRAM(s) IV Push every 8 hours PRN Nausea and/or Vomiting      Packed Red Cells Order:  1 Unit  Indication: Hgb <8 gm/dL -Stable Cardiovascular Disease or Acute Co  Infuse Unit : 2.5 Hours (12-15-24 @ 10:30)  Packed Red Cells Order:  1 Unit  Indication: Hgb <8 gm/dL -Stable Cardiovascular Disease or Acute Co  Infuse Unit : 2.5 Hours (12-15-24 @ 10:30)      DIET:  Diet, NPO:   Except Medications  With Ice Chips/Sips of Water (12-15-24 @ 10:34) [Active]          ALLERGIES:   Allergies    No Known Allergies    Intolerances        Substance Use:   (  ) never used  (  ) other:  Tobacco Usage:  (   ) never smoked   (   ) former smoker   (   ) current smoker  (     ) pack year  (        ) last cigarette date  Alcohol Usage:    Family History   IBD (  ) Yes   (  ) No  GI Malignancy (  )  Yes    (  ) No    Health Management  Last Colonoscopy:  Last Endoscopy:     VITAL SIGNS:   Vital Signs Last 24 Hrs  T(C): 36.7 (15 Dec 2024 14:20), Max: 37.1 (15 Dec 2024 11:32)  T(F): 98.1 (15 Dec 2024 14:20), Max: 98.7 (15 Dec 2024 11:32)  HR: 93 (15 Dec 2024 14:20) (82 - 110)  BP: 104/64 (15 Dec 2024 14:20) (87/49 - 106/60)  BP(mean): 64 (15 Dec 2024 06:41) (62 - 68)  RR: 16 (15 Dec 2024 14:20) (16 - 18)  SpO2: 98% (15 Dec 2024 14:20) (98% - 100%)    Parameters below as of 15 Dec 2024 14:20  Patient On (Oxygen Delivery Method): room air      I&O's Summary      PHYSICAL EXAM:   GENERAL:  No acute distress  HEENT:  NC/AT, conjunctiva clear, sclera anicteric  CHEST:  No increased effort  HEART:  Regular rate  ABDOMEN:  Soft, non-tender, non-distended, normoactive bowel sounds, no rebound or guarding  EXTREMITIES: No edema  SKIN:  Warm, dry  NEURO:  Calm, cooperative    LABS:                        6.2    8.94  )-----------( 125      ( 15 Dec 2024 08:14 )             18.3     Hemoglobin: 6.2 g/dL (12-15-24 @ 08:14)  Hemoglobin: 6.3 g/dL (12-15-24 @ 07:09)  Hemoglobin: 10.0 g/dL (12-14-24 @ 05:00)  Hemoglobin: 11.0 g/dL (12-13-24 @ 23:10)    12-15    144  |  114[H]  |  23[H]  ----------------------------<  104[H]  3.9   |  25  |  0.76    Ca    7.7[L]      15 Dec 2024 07:09  Phos  1.9     12-15  Mg     2.2     12-15    TPro  5.0[L]  /  Alb  2.9[L]  /  TBili  0.5  /  DBili  x   /  AST  17  /  ALT  33  /  AlkPhos  33[L]  12-15    LIVER FUNCTIONS - ( 15 Dec 2024 07:09 )  Alb: 2.9 g/dL / Pro: 5.0 g/dL / ALK PHOS: 33 U/L / ALT: 33 U/L DA / AST: 17 U/L / GGT: x             PT/INR - ( 14 Dec 2024 05:00 )   PT: 13.3 sec;   INR: 1.15 ratio         PTT - ( 14 Dec 2024 05:00 )  PTT:24.2 sec    Urinalysis with Rflx Culture (collected 14 Dec 2024 05:00)    Culture - Blood (collected 14 Dec 2024 05:00)  Source: .Blood BLOOD  Preliminary Report (15 Dec 2024 11:01):    No growth at 24 hours    Culture - Blood (collected 14 Dec 2024 04:50)  Source: .Blood BLOOD  Preliminary Report (15 Dec 2024 11:01):    No growth at 24 hours                                        RADIOLOGY & ADDITIONAL STUDIES:

## 2024-12-15 NOTE — PROGRESS NOTE ADULT - SUBJECTIVE AND OBJECTIVE BOX
Patient is seen and examined at the bed side, is afebrile. The CT head is negative but h/h dropped to 6.2 from 10.0. The Leukocytosis trended down to normal.      REVIEW OF SYSTEMS: All other review systems are negative      ALLERGIES: No Known Allergies      Vital Signs Last 24 Hrs  T(C): 36.3 (15 Dec 2024 10:11), Max: 36.9 (14 Dec 2024 11:40)  T(F): 97.4 (15 Dec 2024 10:11), Max: 98.4 (14 Dec 2024 11:40)  HR: 109 (15 Dec 2024 10:11) (82 - 110)  BP: 106/60 (15 Dec 2024 10:11) (87/49 - 106/60)  BP(mean): 64 (15 Dec 2024 06:41) (62 - 68)  RR: 18 (15 Dec 2024 10:11) (17 - 18)  SpO2: 99% (15 Dec 2024 10:11) (99% - 100%)    Parameters below as of 15 Dec 2024 10:11  Patient On (Oxygen Delivery Method): room air        PHYSICAL EXAM:  GENERAL: Not in distress   HEENT: Left ear has hearing aid, but no discharge noted  CHEST/LUNG:  Air entry bilaterally  HEART: s1 and s2 present  ABDOMEN:  Nontender and  Nondistended  EXTREMITIES: No pedal  edema  CNS: Awake and Alert      LABS:                        6.2    8.94  )-----------( 125      ( 15 Dec 2024 08:14 )             18.3                           10.0   16.28 )-----------( 238      ( 14 Dec 2024 05:00 )             29.7       12-15    144  |  114[H]  |  23[H]  ----------------------------<  104[H]  3.9   |  25  |  0.76    Ca    7.7[L]      15 Dec 2024 07:09  Phos  1.9     12-15  Mg     2.2     12-15    TPro  5.0[L]  /  Alb  2.9[L]  /  TBili  0.5  /  DBili  x   /  AST  17  /  ALT  33  /  AlkPhos  33[L]  12-15    12-    141  |  111[H]  |  42[H]  ----------------------------<  159[H]  3.9   |  24  |  0.88    Ca    8.6      14 Dec 2024 05:00  Phos  2.7       Mg     2.1         TPro  6.6  /  Alb  4.0  /  TBili  0.5  /  DBili  x   /  AST  23  /  ALT  54  /  AlkPhos  49      PT/INR - ( 14 Dec 2024 05:00 )   PT: 13.3 sec;   INR: 1.15 ratio      PTT - ( 14 Dec 2024 05:00 )  PTT:24.2 sec      Urinalysis Basic - ( 14 Dec 2024 05:00 )  Color: Yellow / Appearance: Clear / S.042 / pH: x  Gluc: 159 mg/dL / Ketone: 40 mg/dL  / Bili: Negative / Urobili: 0.2 mg/dL   Blood: x / Protein: Negative mg/dL / Nitrite: Negative   Leuk Esterase: Negative / RBC: x / WBC x   Sq Epi: x / Non Sq Epi: x / Bacteria: x      CAPILLARY BLOOD GLUCOSE  POCT Blood Glucose.: 187 mg/dL (14 Dec 2024 12:02)  POCT Blood Glucose.: 122 mg/dL (14 Dec 2024 07:20)        MEDICATIONS  (STANDING):    aspirin  chewable 81 milliGRAM(s) Oral daily  atorvastatin 40 milliGRAM(s) Oral at bedtime  cefepime   IVPB      cefepime   IVPB 1000 milliGRAM(s) IV Intermittent every 8 hours  influenza   Vaccine 0.5 milliLiter(s) IntraMuscular once  insulin lispro (ADMELOG) corrective regimen sliding scale   SubCutaneous three times a day before meals  insulin lispro (ADMELOG) corrective regimen sliding scale   SubCutaneous at bedtime  vancomycin  IVPB 1000 milliGRAM(s) IV Intermittent every 12 hours        RADIOLOGY & ADDITIONAL TESTS:    24 : CT Head No Cont (24 @ 14:22) IMPRESSION: No acute intracranial hemorrhage, mass effect, or shift of the midline structures.    24 : Xray Chest 1 View-PORTABLE IMMEDIATE (Xray Chest 1 View-PORTABLE IMMEDIATE .) (24 @ 23:54) The heart is normal in size.    The lungs are clear. No pneumothorax. No pleural effusion.  No acute osseous abnormalities.      MICROBIOLOGY DATA:

## 2024-12-16 ENCOUNTER — TRANSCRIPTION ENCOUNTER (OUTPATIENT)
Age: 39
End: 2024-12-16

## 2024-12-16 DIAGNOSIS — K92.2 GASTROINTESTINAL HEMORRHAGE, UNSPECIFIED: ICD-10-CM

## 2024-12-16 DIAGNOSIS — R79.89 OTHER SPECIFIED ABNORMAL FINDINGS OF BLOOD CHEMISTRY: ICD-10-CM

## 2024-12-16 LAB
ALBUMIN SERPL ELPH-MCNC: 3.1 G/DL — LOW (ref 3.5–5)
ALP SERPL-CCNC: 40 U/L — SIGNIFICANT CHANGE UP (ref 40–120)
ALT FLD-CCNC: 34 U/L DA — SIGNIFICANT CHANGE UP (ref 10–60)
ANION GAP SERPL CALC-SCNC: 6 MMOL/L — SIGNIFICANT CHANGE UP (ref 5–17)
APTT BLD: 26.4 SEC — SIGNIFICANT CHANGE UP (ref 24.5–35.6)
AST SERPL-CCNC: 21 U/L — SIGNIFICANT CHANGE UP (ref 10–40)
BASOPHILS # BLD AUTO: 0.04 K/UL — SIGNIFICANT CHANGE UP (ref 0–0.2)
BASOPHILS NFR BLD AUTO: 0.4 % — SIGNIFICANT CHANGE UP (ref 0–2)
BILIRUB SERPL-MCNC: 1.1 MG/DL — SIGNIFICANT CHANGE UP (ref 0.2–1.2)
BUN SERPL-MCNC: 12 MG/DL — SIGNIFICANT CHANGE UP (ref 7–18)
CALCIUM SERPL-MCNC: 8.5 MG/DL — SIGNIFICANT CHANGE UP (ref 8.4–10.5)
CHLORIDE SERPL-SCNC: 111 MMOL/L — HIGH (ref 96–108)
CO2 SERPL-SCNC: 25 MMOL/L — SIGNIFICANT CHANGE UP (ref 22–31)
CREAT SERPL-MCNC: 0.71 MG/DL — SIGNIFICANT CHANGE UP (ref 0.5–1.3)
EGFR: 119 ML/MIN/1.73M2 — SIGNIFICANT CHANGE UP
EOSINOPHIL # BLD AUTO: 0.17 K/UL — SIGNIFICANT CHANGE UP (ref 0–0.5)
EOSINOPHIL NFR BLD AUTO: 1.7 % — SIGNIFICANT CHANGE UP (ref 0–6)
GLUCOSE BLDC GLUCOMTR-MCNC: 101 MG/DL — HIGH (ref 70–99)
GLUCOSE BLDC GLUCOMTR-MCNC: 126 MG/DL — HIGH (ref 70–99)
GLUCOSE BLDC GLUCOMTR-MCNC: 83 MG/DL — SIGNIFICANT CHANGE UP (ref 70–99)
GLUCOSE BLDC GLUCOMTR-MCNC: 96 MG/DL — SIGNIFICANT CHANGE UP (ref 70–99)
GLUCOSE SERPL-MCNC: 129 MG/DL — HIGH (ref 70–99)
HCT VFR BLD CALC: 28 % — LOW (ref 39–50)
HCT VFR BLD CALC: 29.2 % — LOW (ref 39–50)
HGB BLD-MCNC: 10.2 G/DL — LOW (ref 13–17)
HGB BLD-MCNC: 9.8 G/DL — LOW (ref 13–17)
IMM GRANULOCYTES NFR BLD AUTO: 0.7 % — SIGNIFICANT CHANGE UP (ref 0–0.9)
INR BLD: 1.18 RATIO — HIGH (ref 0.85–1.16)
LYMPHOCYTES # BLD AUTO: 2.77 K/UL — SIGNIFICANT CHANGE UP (ref 1–3.3)
LYMPHOCYTES # BLD AUTO: 28.4 % — SIGNIFICANT CHANGE UP (ref 13–44)
MAGNESIUM SERPL-MCNC: 2.3 MG/DL — SIGNIFICANT CHANGE UP (ref 1.6–2.6)
MCHC RBC-ENTMCNC: 30.2 PG — SIGNIFICANT CHANGE UP (ref 27–34)
MCHC RBC-ENTMCNC: 30.6 PG — SIGNIFICANT CHANGE UP (ref 27–34)
MCHC RBC-ENTMCNC: 34.9 G/DL — SIGNIFICANT CHANGE UP (ref 32–36)
MCHC RBC-ENTMCNC: 35 G/DL — SIGNIFICANT CHANGE UP (ref 32–36)
MCV RBC AUTO: 86.2 FL — SIGNIFICANT CHANGE UP (ref 80–100)
MCV RBC AUTO: 87.7 FL — SIGNIFICANT CHANGE UP (ref 80–100)
MONOCYTES # BLD AUTO: 0.69 K/UL — SIGNIFICANT CHANGE UP (ref 0–0.9)
MONOCYTES NFR BLD AUTO: 7.1 % — SIGNIFICANT CHANGE UP (ref 2–14)
NEUTROPHILS # BLD AUTO: 6.01 K/UL — SIGNIFICANT CHANGE UP (ref 1.8–7.4)
NEUTROPHILS NFR BLD AUTO: 61.7 % — SIGNIFICANT CHANGE UP (ref 43–77)
NRBC # BLD: 0 /100 WBCS — SIGNIFICANT CHANGE UP (ref 0–0)
NRBC # BLD: 0 /100 WBCS — SIGNIFICANT CHANGE UP (ref 0–0)
PHOSPHATE SERPL-MCNC: 2.6 MG/DL — SIGNIFICANT CHANGE UP (ref 2.5–4.5)
PLATELET # BLD AUTO: 147 K/UL — LOW (ref 150–400)
PLATELET # BLD AUTO: 161 K/UL — SIGNIFICANT CHANGE UP (ref 150–400)
POTASSIUM SERPL-MCNC: 3.6 MMOL/L — SIGNIFICANT CHANGE UP (ref 3.5–5.3)
POTASSIUM SERPL-SCNC: 3.6 MMOL/L — SIGNIFICANT CHANGE UP (ref 3.5–5.3)
PROT SERPL-MCNC: 5.7 G/DL — LOW (ref 6–8.3)
PROTHROM AB SERPL-ACNC: 13.8 SEC — HIGH (ref 9.9–13.4)
RBC # BLD: 3.25 M/UL — LOW (ref 4.2–5.8)
RBC # BLD: 3.33 M/UL — LOW (ref 4.2–5.8)
RBC # FLD: 14.8 % — HIGH (ref 10.3–14.5)
RBC # FLD: 15.1 % — HIGH (ref 10.3–14.5)
SODIUM SERPL-SCNC: 142 MMOL/L — SIGNIFICANT CHANGE UP (ref 135–145)
WBC # BLD: 9.37 K/UL — SIGNIFICANT CHANGE UP (ref 3.8–10.5)
WBC # BLD: 9.75 K/UL — SIGNIFICANT CHANGE UP (ref 3.8–10.5)
WBC # FLD AUTO: 9.37 K/UL — SIGNIFICANT CHANGE UP (ref 3.8–10.5)
WBC # FLD AUTO: 9.75 K/UL — SIGNIFICANT CHANGE UP (ref 3.8–10.5)

## 2024-12-16 PROCEDURE — 43239 EGD BIOPSY SINGLE/MULTIPLE: CPT

## 2024-12-16 RX ORDER — PANTOPRAZOLE SODIUM 40 MG/1
40 TABLET, DELAYED RELEASE ORAL
Refills: 0 | Status: DISCONTINUED | OUTPATIENT
Start: 2024-12-17 | End: 2024-12-17

## 2024-12-16 RX ADMIN — Medication 250 MILLIGRAM(S): at 05:11

## 2024-12-16 RX ADMIN — CEFEPIME 100 MILLIGRAM(S): 2 INJECTION, POWDER, FOR SOLUTION INTRAVENOUS at 16:17

## 2024-12-16 RX ADMIN — CEFEPIME 100 MILLIGRAM(S): 2 INJECTION, POWDER, FOR SOLUTION INTRAVENOUS at 05:11

## 2024-12-16 RX ADMIN — PANTOPRAZOLE SODIUM 40 MILLIGRAM(S): 40 TABLET, DELAYED RELEASE ORAL at 05:13

## 2024-12-16 RX ADMIN — SODIUM CHLORIDE 60 MILLILITER(S): 9 INJECTION, SOLUTION INTRAMUSCULAR; INTRAVENOUS; SUBCUTANEOUS at 09:27

## 2024-12-16 RX ADMIN — CEFEPIME 100 MILLIGRAM(S): 2 INJECTION, POWDER, FOR SOLUTION INTRAVENOUS at 23:11

## 2024-12-16 RX ADMIN — Medication 40 MILLIGRAM(S): at 23:11

## 2024-12-16 RX ADMIN — PANTOPRAZOLE SODIUM 40 MILLIGRAM(S): 40 TABLET, DELAYED RELEASE ORAL at 18:01

## 2024-12-16 NOTE — DISCHARGE NOTE NURSING/CASE MANAGEMENT/SOCIAL WORK - NSDCPEFALRISK_GEN_ALL_CORE
For information on Fall & Injury Prevention, visit: [email or web address]  [email or web address] OR  https://www.cdc.gov/steadi/patient.html

## 2024-12-16 NOTE — PROGRESS NOTE ADULT - PROBLEM SELECTOR PLAN 1
- Planning for EGD today  - Repeat CBC this PM-f/u results - S/p EGD today, showed irregular Z line, two 1-2 mm clean based ulcers in the stomach antrum with surrounding edema/hyperemia. Five cold forceps bx obtained to eval for H pylori, severe bulb duodenitis with several clean based ulcers.  - Per GI, consider alternative to ASA. Pt will need a repeat EGD in 1-2 mo to assess gastric ulcer healing.  - Resumed regular diet.   - C/w PPI BID    - Repeat CBC this PM-f/u results

## 2024-12-16 NOTE — PHYSICAL THERAPY INITIAL EVALUATION ADULT - GENERAL OBSERVATIONS, REHAB EVAL
pt received sitting on the chair, NAD, denies dizziness and pain, saline lock intact, +telemetry; cooperative to PT evaluation

## 2024-12-16 NOTE — PROGRESS NOTE ADULT - ASSESSMENT
40yo, M, from home, with PMH of Chronic b/l otitis media, STEMI (2023) s/p stent, HLD, HTN, and DM.  Presenting with vertigo, left ear tinnitus/fullness, and generalized weakness. Admitted to Telemetry for Severe sepsis 2/2 L ear infection, elevated troponin likely demand ischemia.

## 2024-12-16 NOTE — PHYSICAL THERAPY INITIAL EVALUATION ADULT - PERTINENT HX OF CURRENT PROBLEM, REHAB EVAL
Patient is a 39y M with PMH as listed below presented to ED with vertigo, left ear tinnitus/fullness, and generalized weakness. CT scan is negative. Pt admitted for sespsis and mild elevated troponins, now GI bleed with acute anemia

## 2024-12-16 NOTE — PROGRESS NOTE ADULT - ASSESSMENT
Patient is a 39y old  Male with PMH of chronic b/l otitis media, STEMI (2023) s/p stent, HLD, HTN, and DM, now presents to the ER for evaluation of acute dizziness. left ear tinnitus/fullness, and generalized weakness. On admission, he found to have no fever but tachycardia and Leukocytosis. The CXR  and urine analysis is negative, blood cultures and CT scan of ear/head is pending. He has started on Zosyn and IV Vancomycin, and the ID consult requested to assist with further evaluation and antibiotic management,    # Sepsis ( Tachycardia + Leukocytosis)   # Probable Left otitis media- CT scan is negative  # Acute blood loss    would recommend     1. NPO for EGD today  2. Monitor H/H and transfuse as needed and GI evaluation   3. Continue Cefepime and discontinue IV Vancomycin since Blood cultures are negative  4. Fall precautions    d/w Patient and primary team    Attending Attestation:    Spent more than 35 minutes on total encounter, more than 50 % of the visit was spent counseling and/or coordinating care by the Attending physician.

## 2024-12-16 NOTE — PROGRESS NOTE ADULT - SUBJECTIVE AND OBJECTIVE BOX
Date of Service 12-16-24 @ 09:35    CHIEF COMPLAINT:Patient is a 39y old  Male who presents with a chief complaint of acute vertigo w/ severe sepsis .Pt appears comfortable.    	  REVIEW OF SYSTEMS:  CONSTITUTIONAL: No fever, weight loss, or fatigue  EYES: No eye pain, visual disturbances, or discharge  ENT:  No difficulty hearing, tinnitus, vertigo; No sinus or throat pain  NECK: No pain or stiffness  RESPIRATORY: No cough, wheezing, chills or hemoptysis; No Shortness of Breath  CARDIOVASCULAR: No chest pain, palpitations, passing out, dizziness, or leg swelling  GASTROINTESTINAL: No abdominal or epigastric pain. No nausea, vomiting, or hematemesis; No diarrhea or constipation. No melena or hematochezia.  GENITOURINARY: No dysuria, frequency, hematuria, or incontinence  NEUROLOGICAL: No headaches, memory loss, loss of strength, numbness, or tremors  SKIN: No itching, burning, rashes, or lesions   LYMPH Nodes: No enlarged glands  ENDOCRINE: No heat or cold intolerance; No hair loss  MUSCULOSKELETAL: No joint pain or swelling; No muscle, back, or extremity pain  PSYCHIATRIC: No depression, anxiety, mood swings, or difficulty sleeping  HEME/LYMPH: No easy bruising, or bleeding gums  ALLERGY AND IMMUNOLOGIC: No hives or eczema	      PHYSICAL EXAM:  T(C): 36.5 (12-16-24 @ 05:21), Max: 37.1 (12-15-24 @ 11:32)  HR: 81 (12-16-24 @ 05:21) (69 - 109)  BP: 103/70 (12-16-24 @ 05:21) (95/60 - 115/68)  RR: 17 (12-16-24 @ 05:21) (16 - 18)  SpO2: 99% (12-16-24 @ 05:21) (96% - 100%)  Wt(kg): --  I&O's Summary      Appearance: Normal	  HEENT:   Normal oral mucosa, PERRL, EOMI	  Lymphatic: No lymphadenopathy  Cardiovascular: Normal S1 S2, No JVD, No murmurs, No edema  Respiratory: Lungs clear to auscultation	  Psychiatry: A & O x 3, Mood & affect appropriate  Gastrointestinal:  Soft, Non-tender, + BS	  Skin: No rashes, No ecchymoses, No cyanosis	  Neurologic: Non-focal  Extremities: Normal range of motion, No clubbing, cyanosis or edema  Vascular: Peripheral pulses palpable 2+ bilaterally    MEDICATIONS  (STANDING):  atorvastatin 40 milliGRAM(s) Oral at bedtime  cefepime   IVPB      cefepime   IVPB 1000 milliGRAM(s) IV Intermittent every 8 hours  influenza   Vaccine 0.5 milliLiter(s) IntraMuscular once  insulin lispro (ADMELOG) corrective regimen sliding scale   SubCutaneous three times a day before meals  insulin lispro (ADMELOG) corrective regimen sliding scale   SubCutaneous at bedtime  pantoprazole  Injectable 40 milliGRAM(s) IV Push every 12 hours  sodium chloride 0.9%. 1000 milliLiter(s) (60 mL/Hr) IV Continuous <Continuous>  vancomycin  IVPB 1000 milliGRAM(s) IV Intermittent every 12 hours      	  	  LABS:	 	    Troponin I, High Sensitivity Result: 149.8 ng/L (12-14 @ 05:00)  Troponin I, High Sensitivity Result: 154.7 ng/L (12-14 @ 00:35)  Troponin I, High Sensitivity Result: 149.4 ng/L (12-13 @ 23:10)                            9.8    9.75  )-----------( 147      ( 16 Dec 2024 06:57 )             28.0     12-16    142  |  111[H]  |  12  ----------------------------<  129[H]  3.6   |  25  |  0.71    Ca    8.5      16 Dec 2024 06:57  Phos  2.6     12-16  Mg     2.3     12-16    TPro  5.7[L]  /  Alb  3.1[L]  /  TBili  1.1  /  DBili  x   /  AST  21  /  ALT  34  /  AlkPhos  40  12-16      TSH: Thyroid Stimulating Hormone, Serum: 1.99 uU/mL (12-14 @ 05:00)      a1c 5.8.

## 2024-12-16 NOTE — PROGRESS NOTE ADULT - ASSESSMENT
39y M with PMH of chronic b/l otitis media, STEMI (2023) s/p stent at [facility] , HLD, HTN, and DM presenting with vertigo, left ear tinnitus/fullness, and generalized weakness. Patient states that yesterday he started feeling "bad", described as generalized weakness, and later suddenly developed dizziness described as head spinning while standing up in the bathroom,sespsis. and mild elevated troponins,now GI bleed with acute anemia  1.Sepsis-s/p pan cx,abx started.  2.CAD-off asa, plavix,cont statin-hold b blocker.  3.GI bleed with acute anemia-NPO,GI eval noted,await EGD.  4.DM-Insulin.  5.Lipid d/o-statin.  6.NPO,IVF.  7.Echocardiogram.  8.PPI.

## 2024-12-16 NOTE — PROGRESS NOTE ADULT - PROBLEM SELECTOR PLAN 5
H/o STEMI in 2023 s/p stent on ASA, Atorvastatin, Metoprolol and Plavix  - Currently holding meds iso EGD while NPO   - Resume meds accordingly H/o STEMI in 2023 s/p stent on ASA, Atorvastatin, Metoprolol and Plavix  - Currently holding meds iso EGD-See above for ASA per GI ? Plavix    - Resume meds accordingly

## 2024-12-16 NOTE — PROGRESS NOTE ADULT - SUBJECTIVE AND OBJECTIVE BOX
Patient is seen and examined at the bed side, is afebrile. He is feeling better, dizziness resolved. The Blood cultures have NGTD.      REVIEW OF SYSTEMS: All other review systems are negative      ALLERGIES: No Known Allergies      Vital Signs Last 24 Hrs  T(C): 36.6 (16 Dec 2024 09:55), Max: 36.9 (15 Dec 2024 14:11)  T(F): 97.8 (16 Dec 2024 09:55), Max: 98.4 (15 Dec 2024 14:11)  HR: 88 (16 Dec 2024 09:55) (69 - 102)  BP: 103/71 (16 Dec 2024 09:55) (100/62 - 115/68)  BP(mean): --  RR: 18 (16 Dec 2024 09:55) (16 - 18)  SpO2: 99% (16 Dec 2024 09:55) (96% - 100%)    Parameters below as of 16 Dec 2024 09:55  Patient On (Oxygen Delivery Method): room air      PHYSICAL EXAM:  GENERAL: Not in distress   HEENT: Left ear has hearing aid, but no discharge noted  CHEST/LUNG:  Air entry bilaterally  HEART: s1 and s2 present  ABDOMEN:  Nontender and  Nondistended  EXTREMITIES: No pedal  edema  CNS: Awake and Alert      LABS:                        9.8    9.75  )-----------( 147      ( 16 Dec 2024 06:57 )             28.0                           10.0   16.28 )-----------( 238      ( 14 Dec 2024 05:00 )             29.7       12-16    142  |  111[H]  |  12  ----------------------------<  129[H]  3.6   |  25  |  0.71    Ca    8.5      16 Dec 2024 06:57  Phos  2.6     12-16  Mg     2.3     12-16    TPro  5.7[L]  /  Alb  3.1[L]  /  TBili  1.1  /  DBili  x   /  AST  21  /  ALT  34  /  AlkPhos  40  12-16    12-15    144  |  114[H]  |  23[H]  ----------------------------<  104[H]  3.9   |  25  |  0.76    Ca    7.7[L]      15 Dec 2024 07:09  Phos  1.9     12-15  Mg     2.2     12-15    TPro  5.0[L]  /  Alb  2.9[L]  /  TBili  0.5  /  DBili  x   /  AST  17  /  ALT  33  /  AlkPhos  33[L]  12-15  PT/INR - ( 14 Dec 2024 05:00 )   PT: 13.3 sec;   INR: 1.15 ratio      PTT - ( 14 Dec 2024 05:00 )  PTT:24.2 sec        CAPILLARY BLOOD GLUCOSE  POCT Blood Glucose.: 187 mg/dL (14 Dec 2024 12:02)  POCT Blood Glucose.: 122 mg/dL (14 Dec 2024 07:20)        MEDICATIONS  (STANDING):    atorvastatin 40 milliGRAM(s) Oral at bedtime  cefepime   IVPB      cefepime   IVPB 1000 milliGRAM(s) IV Intermittent every 8 hours  influenza   Vaccine 0.5 milliLiter(s) IntraMuscular once  insulin lispro (ADMELOG) corrective regimen sliding scale   SubCutaneous three times a day before meals  insulin lispro (ADMELOG) corrective regimen sliding scale   SubCutaneous at bedtime  pantoprazole  Injectable 40 milliGRAM(s) IV Push every 12 hours  sodium chloride 0.9%. 1000 milliLiter(s) (60 mL/Hr) IV Continuous <Continuous>  vancomycin  IVPB 1000 milliGRAM(s) IV Intermittent every 12 hours      RADIOLOGY & ADDITIONAL TESTS:    12/14/24 : CT Head No Cont (12.14.24 @ 14:22) IMPRESSION: No acute intracranial hemorrhage, mass effect, or shift of the midline structures.    12/13/24 : Xray Chest 1 View-PORTABLE IMMEDIATE (Xray Chest 1 View-PORTABLE IMMEDIATE .) (12.13.24 @ 23:54) The heart is normal in size.    The lungs are clear. No pneumothorax. No pleural effusion.  No acute osseous abnormalities.      MICROBIOLOGY DATA:    Culture - Blood (12.14.24 @ 05:00)   Specimen Source: .Blood BLOOD  Culture Results: No growth at 48 Hours    Culture - Blood (12.14.24 @ 04:50)   Specimen Source: .Blood BLOOD  Culture Results: No growth at 48 Hours

## 2024-12-16 NOTE — PROGRESS NOTE ADULT - ASSESSMENT
SEEN AND EXAMINED  AWAKE ALERT NOT IN NAY DISTRESS  DENIES ABD PAIN  BUT AGAIN HAD ONE EPISODE   NO DIZZYNESS   denies cp or sob or palp  bm ok     lungs clear  heart ok abd soft bs nml  no epigastric or tenderness elsewhere   labs noted  hgb 9.8   acute GIB  received 2 units of PBCs  ghgb ok now still to be watched   GI on board   d/w ID  pts Blood cxs neg  she will look into it

## 2024-12-16 NOTE — PROGRESS NOTE ADULT - SUBJECTIVE AND OBJECTIVE BOX
NP Note discussed with  primary attending    Patient is a 39y old  Male who presents with a chief complaint of acute vertigo w/ severe sepsis (16 Dec 2024 14:09)      INTERVAL HPI/OVERNIGHT EVENTS: Pt found sitting upright in chair.  No new complaints or concerns.  Denies HA, spinning sensation, lightheadedness, SOB, CP, or abdominal pain.  Reports LBM yesterday, described as black.    MEDICATIONS  (STANDING):  atorvastatin 40 milliGRAM(s) Oral at bedtime  cefepime   IVPB      cefepime   IVPB 1000 milliGRAM(s) IV Intermittent every 8 hours  influenza   Vaccine 0.5 milliLiter(s) IntraMuscular once  insulin lispro (ADMELOG) corrective regimen sliding scale   SubCutaneous three times a day before meals  insulin lispro (ADMELOG) corrective regimen sliding scale   SubCutaneous at bedtime  pantoprazole  Injectable 40 milliGRAM(s) IV Push every 12 hours  sodium chloride 0.9%. 1000 milliLiter(s) (60 mL/Hr) IV Continuous <Continuous>    MEDICATIONS  (PRN):  acetaminophen     Tablet .. 650 milliGRAM(s) Oral every 6 hours PRN Temp greater or equal to 38C (100.4F), Mild Pain (1 - 3)  meclizine 25 milliGRAM(s) Oral every 8 hours PRN Dizziness  ondansetron Injectable 4 milliGRAM(s) IV Push every 8 hours PRN Nausea and/or Vomiting      __________________________________________________  REVIEW OF SYSTEMS:    CONSTITUTIONAL: No fever  EYES: No acute visual disturbances  NECK: No pain or stiffness  RESPIRATORY: No cough; No shortness of breath  CARDIOVASCULAR: No chest pain, no palpitations  GASTROINTESTINAL: No pain. No nausea or vomiting.  No diarrhea   NEUROLOGICAL: No headache or numbness, no tremors  MUSCULOSKELETAL: No joint pain, no muscle pain  GENITOURINARY: No dysuria, no frequency, no hesitancy  PSYCHIATRY: No depression , no anxiety  ALL OTHER  ROS negative        Vital Signs Last 24 Hrs  T(C): 36.7 (16 Dec 2024 14:21), Max: 36.7 (16 Dec 2024 14:21)  T(F): 98.1 (16 Dec 2024 14:21), Max: 98.1 (16 Dec 2024 14:21)  HR: 82 (16 Dec 2024 15:35) (75 - 104)  BP: 104/62 (16 Dec 2024 15:35) (94/58 - 129/81)  BP(mean): 74 (16 Dec 2024 15:35) (74 - 75)  RR: 15 (16 Dec 2024 15:35) (15 - 19)  SpO2: 100% (16 Dec 2024 15:35) (99% - 100%)    Parameters below as of 16 Dec 2024 15:35  Patient On (Oxygen Delivery Method): room air        ________________________________________________  PHYSICAL EXAM:  GENERAL: NAD  HEENT: Normocephalic;  conjunctivae and sclerae clear; moist mucous membranes   NECK : Supple  CHEST/LUNG: Clear to auscultation bilaterally with good air entry   HEART: S1 S2  regular; no murmurs, gallops or rubs  ABDOMEN: Soft, Nontender, Nondistended; Bowel sounds present x 4 quad.  No rebound or guarding.   EXTREMITIES: No cyanosis; no edema; no calf tenderness  SKIN: Warm and dry; no rash  NERVOUS SYSTEM:  Awake and alert; Oriented to place, person and time; no new deficits    _________________________________________________  LABS:                        9.8    9.75  )-----------( 147      ( 16 Dec 2024 06:57 )             28.0     12-16    142  |  111[H]  |  12  ----------------------------<  129[H]  3.6   |  25  |  0.71    Ca    8.5      16 Dec 2024 06:57  Phos  2.6     12-16  Mg     2.3     12-16    TPro  5.7[L]  /  Alb  3.1[L]  /  TBili  1.1  /  DBili  x   /  AST  21  /  ALT  34  /  AlkPhos  40  12-16    PT/INR - ( 16 Dec 2024 06:57 )   PT: 13.8 sec;   INR: 1.18 ratio         PTT - ( 16 Dec 2024 06:57 )  PTT:26.4 sec  Urinalysis Basic - ( 16 Dec 2024 06:57 )    Color: x / Appearance: x / SG: x / pH: x  Gluc: 129 mg/dL / Ketone: x  / Bili: x / Urobili: x   Blood: x / Protein: x / Nitrite: x   Leuk Esterase: x / RBC: x / WBC x   Sq Epi: x / Non Sq Epi: x / Bacteria: x      CAPILLARY BLOOD GLUCOSE      POCT Blood Glucose.: 101 mg/dL (16 Dec 2024 16:52)  POCT Blood Glucose.: 96 mg/dL (16 Dec 2024 11:23)  POCT Blood Glucose.: 126 mg/dL (16 Dec 2024 07:35)  POCT Blood Glucose.: 137 mg/dL (15 Dec 2024 21:28)  POCT Blood Glucose.: 83 mg/dL (15 Dec 2024 16:59)        RADIOLOGY & ADDITIONAL TESTS:    Imaging Personally Reviewed:  YES/NO    Consultant(s) Notes Reviewed:   YES/ No    Care Discussed with Consultants :     Plan of care was discussed with patient and /or primary care giver; all questions and concerns were addressed and care was aligned with patient's wishes.

## 2024-12-16 NOTE — PROGRESS NOTE ADULT - SUBJECTIVE AND OBJECTIVE BOX
HPI:  39y M with PMH of chronic b/l otitis media, STEMI (2023) s/p stent, HLD, HTN, and DM presenting with vertigo, left ear tinnitus/fullness, and generalized weakness. Patient states that yesterday he started feeling "bad", described as generalized weakness, and later suddenly developed dizziness described as head spinning while standing up in the bathroom. Says he immediately laid down on the floor and felt better. After lying on the floor for a few minutes he stood up but says he quickly developed the same head spinning sensation and instability. Says he has never experienced similar symptoms in the past and was worried symptoms were related to his heart given recent STEMI so he came to the ED. Endorses a ringing in his left ear as well as feeling of fullness in the left ear since yesterday however denies pain or discharge from the ear. Uses hearing aide in L ear only.  Patient has a history of frequent ear infections and states that in the past they have been associated pain and and fluid leaking from the ears. Per prior ENT notes in [facility] patient has known bilateral eustachian tube dysfunction and has been offered balloon dilation procedure in the past. Patient denies fever, chills, headache, chest pain, palpitations, shortness of breath, abdominal pain, nausea, vomiting, diarrhea, constipation, and dysuria. When asked if he thought his left face was swollen he said he had not noticed but when showed a picture of himself on cell phone he agreed that the left cheek and jaw appears more swollen than baseline.    (14 Dec 2024 06:53)      Patient is a 39y old  Male who presents with a chief complaint of acute vertigo w/ severe sepsis (16 Dec 2024 09:35)      INTERVAL HPI/OVERNIGHT EVENTS:  T(C): 36.6 (12-16-24 @ 13:10), Max: 36.9 (12-15-24 @ 14:11)  HR: 80 (12-16-24 @ 13:10) (69 - 102)  BP: 109/73 (12-16-24 @ 13:10) (100/62 - 115/68)  RR: 17 (12-16-24 @ 13:10) (16 - 18)  SpO2: 100% (12-16-24 @ 13:10) (96% - 100%)  Wt(kg): --  I&O's Summary      REVIEW OF SYSTEMS: denies fever, chills, SOB, palpitations, chest pain, abdominal pain, nausea, vomitting, diarrhea, constipation, dizziness    MEDICATIONS  (STANDING):  atorvastatin 40 milliGRAM(s) Oral at bedtime  cefepime   IVPB      cefepime   IVPB 1000 milliGRAM(s) IV Intermittent every 8 hours  influenza   Vaccine 0.5 milliLiter(s) IntraMuscular once  insulin lispro (ADMELOG) corrective regimen sliding scale   SubCutaneous three times a day before meals  insulin lispro (ADMELOG) corrective regimen sliding scale   SubCutaneous at bedtime  pantoprazole  Injectable 40 milliGRAM(s) IV Push every 12 hours  sodium chloride 0.9%. 1000 milliLiter(s) (60 mL/Hr) IV Continuous <Continuous>    MEDICATIONS  (PRN):  acetaminophen     Tablet .. 650 milliGRAM(s) Oral every 6 hours PRN Temp greater or equal to 38C (100.4F), Mild Pain (1 - 3)  meclizine 25 milliGRAM(s) Oral every 8 hours PRN Dizziness  ondansetron Injectable 4 milliGRAM(s) IV Push every 8 hours PRN Nausea and/or Vomiting      PHYSICAL EXAM:  GENERAL: NAD, well-groomed, well-developed  HEAD:  Atraumatic, Normocephalic  EYES: EOMI, PERRLA, conjunctiva and sclera clear  ENMT: No tonsillar erythema, exudates, or enlargement; Moist mucous membranes, Good dentition, No lesions  NECK: Supple, No JVD, Normal thyroid  NERVOUS SYSTEM:  Alert & Oriented X3, Good concentration; Motor Strength 5/5 B/L upper and lower extremities; DTRs 2+ intact and symmetric  CHEST/LUNG: Clear to percussion bilaterally; No rales, rhonchi, wheezing, or rubs  HEART: Regular rate and rhythm; No murmurs, rubs, or gallops  ABDOMEN: Soft, Nontender, Nondistended; Bowel sounds present  EXTREMITIES:  2+ Peripheral Pulses, No clubbing, cyanosis, or edema  LYMPH: No lymphadenopathy noted  SKIN: No rashes or lesions  LABS:                        9.8    9.75  )-----------( 147      ( 16 Dec 2024 06:57 )             28.0     12-16    142  |  111[H]  |  12  ----------------------------<  129[H]  3.6   |  25  |  0.71    Ca    8.5      16 Dec 2024 06:57  Phos  2.6     12-16  Mg     2.3     12-16    TPro  5.7[L]  /  Alb  3.1[L]  /  TBili  1.1  /  DBili  x   /  AST  21  /  ALT  34  /  AlkPhos  40  12-16    PT/INR - ( 16 Dec 2024 06:57 )   PT: 13.8 sec;   INR: 1.18 ratio         PTT - ( 16 Dec 2024 06:57 )  PTT:26.4 sec  Urinalysis Basic - ( 16 Dec 2024 06:57 )    Color: x / Appearance: x / SG: x / pH: x  Gluc: 129 mg/dL / Ketone: x  / Bili: x / Urobili: x   Blood: x / Protein: x / Nitrite: x   Leuk Esterase: x / RBC: x / WBC x   Sq Epi: x / Non Sq Epi: x / Bacteria: x      CAPILLARY BLOOD GLUCOSE      POCT Blood Glucose.: 96 mg/dL (16 Dec 2024 11:23)  POCT Blood Glucose.: 126 mg/dL (16 Dec 2024 07:35)  POCT Blood Glucose.: 137 mg/dL (15 Dec 2024 21:28)  POCT Blood Glucose.: 83 mg/dL (15 Dec 2024 16:59)        Urinalysis Basic - ( 16 Dec 2024 06:57 )    Color: x / Appearance: x / SG: x / pH: x  Gluc: 129 mg/dL / Ketone: x  / Bili: x / Urobili: x   Blood: x / Protein: x / Nitrite: x   Leuk Esterase: x / RBC: x / WBC x   Sq Epi: x / Non Sq Epi: x / Bacteria: x

## 2024-12-16 NOTE — DISCHARGE NOTE NURSING/CASE MANAGEMENT/SOCIAL WORK - PATIENT PORTAL LINK FT
You can access the FollowMyHealth Patient Portal offered by [facility] by registering at the following website: [email or web address]. By joining [employer]’s FollowMyHealth portal, you will also be able to view your health information using other applications (apps) compatible with our system.

## 2024-12-16 NOTE — PROGRESS NOTE ADULT - PROBLEM SELECTOR PLAN 3
- On admission WBC 17.81,  HR , Lactate 2.5 met severe sepsis criteria with suspected source L ear infection  - S/p CXR   - Currently on IVF   - C/w Vancomycin and Zosyn.  Vanco trough done early. PM trough requested-f/u   - ID-Dr. Bolanos - On admission WBC 17.81,  HR , Lactate 2.5 met severe sepsis criteria with suspected source L ear infection  - S/p CXR   - S/p on IVF   - C/w VCefepime   - ID-Dr. Bolanos - On admission WBC 17.81,  HR , Lactate 2.5 met severe sepsis criteria with suspected source L ear infection  - S/p CXR   - S/p IVF & Vanco    - C/w VCefepime   - ID-Dr. Bolanos

## 2024-12-17 ENCOUNTER — TRANSCRIPTION ENCOUNTER (OUTPATIENT)
Age: 39
End: 2024-12-17

## 2024-12-17 DIAGNOSIS — Z75.8 OTHER PROBLEMS RELATED TO MEDICAL FACILITIES AND OTHER HEALTH CARE: ICD-10-CM

## 2024-12-17 LAB
ANION GAP SERPL CALC-SCNC: 4 MMOL/L — LOW (ref 5–17)
BASOPHILS # BLD AUTO: 0.03 K/UL — SIGNIFICANT CHANGE UP (ref 0–0.2)
BASOPHILS NFR BLD AUTO: 0.4 % — SIGNIFICANT CHANGE UP (ref 0–2)
BUN SERPL-MCNC: 15 MG/DL — SIGNIFICANT CHANGE UP (ref 7–18)
CALCIUM SERPL-MCNC: 8.2 MG/DL — LOW (ref 8.4–10.5)
CHLORIDE SERPL-SCNC: 113 MMOL/L — HIGH (ref 96–108)
CO2 SERPL-SCNC: 25 MMOL/L — SIGNIFICANT CHANGE UP (ref 22–31)
CREAT SERPL-MCNC: 0.73 MG/DL — SIGNIFICANT CHANGE UP (ref 0.5–1.3)
EGFR: 118 ML/MIN/1.73M2 — SIGNIFICANT CHANGE UP
EOSINOPHIL # BLD AUTO: 0.26 K/UL — SIGNIFICANT CHANGE UP (ref 0–0.5)
EOSINOPHIL NFR BLD AUTO: 3.3 % — SIGNIFICANT CHANGE UP (ref 0–6)
GLUCOSE BLDC GLUCOMTR-MCNC: 108 MG/DL — HIGH (ref 70–99)
GLUCOSE BLDC GLUCOMTR-MCNC: 118 MG/DL — HIGH (ref 70–99)
GLUCOSE BLDC GLUCOMTR-MCNC: 86 MG/DL — SIGNIFICANT CHANGE UP (ref 70–99)
GLUCOSE BLDC GLUCOMTR-MCNC: 91 MG/DL — SIGNIFICANT CHANGE UP (ref 70–99)
GLUCOSE SERPL-MCNC: 97 MG/DL — SIGNIFICANT CHANGE UP (ref 70–99)
HCT VFR BLD CALC: 27.4 % — LOW (ref 39–50)
HGB BLD-MCNC: 9.6 G/DL — LOW (ref 13–17)
IMM GRANULOCYTES NFR BLD AUTO: 0.8 % — SIGNIFICANT CHANGE UP (ref 0–0.9)
LYMPHOCYTES # BLD AUTO: 2.44 K/UL — SIGNIFICANT CHANGE UP (ref 1–3.3)
LYMPHOCYTES # BLD AUTO: 30.5 % — SIGNIFICANT CHANGE UP (ref 13–44)
MCHC RBC-ENTMCNC: 31 PG — SIGNIFICANT CHANGE UP (ref 27–34)
MCHC RBC-ENTMCNC: 35 G/DL — SIGNIFICANT CHANGE UP (ref 32–36)
MCV RBC AUTO: 88.4 FL — SIGNIFICANT CHANGE UP (ref 80–100)
MONOCYTES # BLD AUTO: 0.66 K/UL — SIGNIFICANT CHANGE UP (ref 0–0.9)
MONOCYTES NFR BLD AUTO: 8.3 % — SIGNIFICANT CHANGE UP (ref 2–14)
NEUTROPHILS # BLD AUTO: 4.54 K/UL — SIGNIFICANT CHANGE UP (ref 1.8–7.4)
NEUTROPHILS NFR BLD AUTO: 56.7 % — SIGNIFICANT CHANGE UP (ref 43–77)
NRBC # BLD: 0 /100 WBCS — SIGNIFICANT CHANGE UP (ref 0–0)
PLATELET # BLD AUTO: 162 K/UL — SIGNIFICANT CHANGE UP (ref 150–400)
POTASSIUM SERPL-MCNC: 3.8 MMOL/L — SIGNIFICANT CHANGE UP (ref 3.5–5.3)
POTASSIUM SERPL-SCNC: 3.8 MMOL/L — SIGNIFICANT CHANGE UP (ref 3.5–5.3)
RBC # BLD: 3.1 M/UL — LOW (ref 4.2–5.8)
RBC # FLD: 14.9 % — HIGH (ref 10.3–14.5)
SODIUM SERPL-SCNC: 142 MMOL/L — SIGNIFICANT CHANGE UP (ref 135–145)
WBC # BLD: 7.99 K/UL — SIGNIFICANT CHANGE UP (ref 3.8–10.5)
WBC # FLD AUTO: 7.99 K/UL — SIGNIFICANT CHANGE UP (ref 3.8–10.5)

## 2024-12-17 PROCEDURE — 99232 SBSQ HOSP IP/OBS MODERATE 35: CPT

## 2024-12-17 PROCEDURE — 88312 SPECIAL STAINS GROUP 1: CPT | Mod: 26

## 2024-12-17 PROCEDURE — 88305 TISSUE EXAM BY PATHOLOGIST: CPT | Mod: 26

## 2024-12-17 RX ORDER — PANTOPRAZOLE SODIUM 40 MG/1
40 TABLET, DELAYED RELEASE ORAL
Refills: 0 | Status: DISCONTINUED | OUTPATIENT
Start: 2024-12-17 | End: 2024-12-18

## 2024-12-17 RX ORDER — PANTOPRAZOLE SODIUM 40 MG/1
40 TABLET, DELAYED RELEASE ORAL
Refills: 0 | Status: DISCONTINUED | OUTPATIENT
Start: 2024-12-17 | End: 2024-12-17

## 2024-12-17 RX ADMIN — Medication 81 MILLIGRAM(S): at 19:03

## 2024-12-17 RX ADMIN — Medication 40 MILLIGRAM(S): at 21:58

## 2024-12-17 RX ADMIN — CEFEPIME 100 MILLIGRAM(S): 2 INJECTION, POWDER, FOR SOLUTION INTRAVENOUS at 05:25

## 2024-12-17 RX ADMIN — PANTOPRAZOLE SODIUM 40 MILLIGRAM(S): 40 TABLET, DELAYED RELEASE ORAL at 05:25

## 2024-12-17 RX ADMIN — PANTOPRAZOLE SODIUM 40 MILLIGRAM(S): 40 TABLET, DELAYED RELEASE ORAL at 17:35

## 2024-12-17 NOTE — PROGRESS NOTE ADULT - PROBLEM SELECTOR PLAN 6
H/o HTN on Metoprolol and Losartan  - Continue holding BP meds iso sepsis and hypotensive  - Continue to monitor BP
H/o HTN on Metoprolol and Losartan  - Continue holding BP meds iso sepsis and hypotensive  - Continue to monitor BP

## 2024-12-17 NOTE — DISCHARGE NOTE PROVIDER - NSDCMRMEDTOKEN_GEN_ALL_CORE_FT
aspirin 81 mg oral tablet: 1 tab(s) orally once a day  atorvastatin 40 mg oral tablet: 1 tab(s) orally once a day (at bedtime)  clopidogrel 75 mg oral tablet: 1 tab(s) orally once a day  empagliflozin 10 mg oral tablet: 1 tab(s) orally once a day  losartan 25 mg oral tablet: 1 tab(s) orally once a day  metoprolol succinate 25 mg oral tablet, extended release: 1 tab(s) orally once a day   atorvastatin 40 mg oral tablet: 1 tab(s) orally once a day (at bedtime)  empagliflozin 10 mg oral tablet: 1 tab(s) orally once a day  meclizine 25 mg oral tablet: 1 tab(s) orally every 8 hours As needed Dizziness   aspirin 81 mg oral tablet: 1 milligram(s) orally once a day  empagliflozin 10 mg oral tablet: 1 tab(s) orally once a day  rosuvastatin 40 mg oral tablet: 1 tab(s) orally once a day (at bedtime)   aspirin 81 mg oral tablet: 1 milligram(s) orally once a day  empagliflozin 10 mg oral tablet: 1 tab(s) orally once a day  Protonix 40 mg oral delayed release tablet: 1 tab(s) orally 2 times a day  rosuvastatin 40 mg oral tablet: 1 tab(s) orally once a day (at bedtime)

## 2024-12-17 NOTE — PROGRESS NOTE ADULT - SUBJECTIVE AND OBJECTIVE BOX
Date of Service 12-17-24 @ 09:51    CHIEF COMPLAINT:Patient is a 39y old  Male who presents with a chief complaint of acute vertigo w/ severe sepsis.Pt appears comfortable.    	  REVIEW OF SYSTEMS:  CONSTITUTIONAL: No fever, weight loss, or fatigue  EYES: No eye pain, visual disturbances, or discharge  ENT:  No difficulty hearing, tinnitus, vertigo; No sinus or throat pain  NECK: No pain or stiffness  RESPIRATORY: No cough, wheezing, chills or hemoptysis; No Shortness of Breath  CARDIOVASCULAR: No chest pain, palpitations, passing out, dizziness, or leg swelling  GASTROINTESTINAL: No abdominal or epigastric pain. No nausea, vomiting, or hematemesis; No diarrhea or constipation. No melena or hematochezia.  GENITOURINARY: No dysuria, frequency, hematuria, or incontinence  NEUROLOGICAL: No headaches, memory loss, loss of strength, numbness, or tremors  SKIN: No itching, burning, rashes, or lesions   LYMPH Nodes: No enlarged glands  ENDOCRINE: No heat or cold intolerance; No hair loss  MUSCULOSKELETAL: No joint pain or swelling; No muscle, back, or extremity pain  PSYCHIATRIC: No depression, anxiety, mood swings, or difficulty sleeping  HEME/LYMPH: No easy bruising, or bleeding gums  ALLERGY AND IMMUNOLOGIC: No hives or eczema	        PHYSICAL EXAM:  T(C): 36.6 (12-17-24 @ 05:00), Max: 36.8 (12-17-24 @ 00:42)  HR: 63 (12-17-24 @ 05:00) (63 - 104)  BP: 90/59 (12-17-24 @ 05:00) (90/59 - 129/81)  RR: 17 (12-17-24 @ 05:00) (15 - 19)  SpO2: 98% (12-17-24 @ 05:00) (96% - 100%)  Wt(kg): --  I&O's Summary      Appearance: Normal	  HEENT:   Normal oral mucosa, PERRL, EOMI	  Lymphatic: No lymphadenopathy  Cardiovascular: Normal S1 S2, No JVD, No murmurs, No edema  Respiratory: Lungs clear to auscultation	  Psychiatry: A & O x 3, Mood & affect appropriate  Gastrointestinal:  Soft, Non-tender, + BS	  Skin: No rashes, No ecchymoses, No cyanosis	  Neurologic: Non-focal  Extremities: Normal range of motion, No clubbing, cyanosis or edema  Vascular: Peripheral pulses palpable 2+ bilaterally    MEDICATIONS  (STANDING):  atorvastatin 40 milliGRAM(s) Oral at bedtime  cefepime   IVPB      cefepime   IVPB 1000 milliGRAM(s) IV Intermittent every 8 hours  influenza   Vaccine 0.5 milliLiter(s) IntraMuscular once  insulin lispro (ADMELOG) corrective regimen sliding scale   SubCutaneous three times a day before meals  insulin lispro (ADMELOG) corrective regimen sliding scale   SubCutaneous at bedtime  pantoprazole    Tablet 40 milliGRAM(s) Oral two times a day      	  	  LABS:	 	                         9.6    7.99  )-----------( 162      ( 17 Dec 2024 06:25 )             27.4     12-17    142  |  113[H]  |  15  ----------------------------<  97  3.8   |  25  |  0.73    Ca    8.2[L]      17 Dec 2024 06:25  Phos  2.6     12-16  Mg     2.3     12-16    TPro  5.7[L]  /  Alb  3.1[L]  /  TBili  1.1  /  DBili  x   /  AST  21  /  ALT  34  /  AlkPhos  40  12-16      TSH: Thyroid Stimulating Hormone, Serum: 1.99 uU/mL (12-14 @ 05:00)      	      < from: EGD (12.16.24 @ 13:50) >  Procedure:        The procedure, indications, preparation and potential complications were    explained to the patient, who indicated understanding and signed the    corresponding consent forms. MAC was administered by anesthesiologist.    Continuous pulse oximetry and blood pressure monitoring were used throughout the    procedure. Supplemental oxygen was used. Patient was placed in the left lateral    decubitus position. The endoscope was introduced through the mouth and advanced    under direct visualization until the second part of the duodenum was reached.    Patient tolerance to the procedure was good. The procedure was not difficult.    Blood loss was minimal.        Limitations/Complications:        There were no apparent limitations or complications        Findings:        Esophagus Additional esophagus findings -Esophagus: irregular Z line..        Stomach Additional stomach findings Stomach: Two 1-2 mm clean based ulcers in    the stomach antrum with surrounding edema/hyperemia. Five cold forceps bx    obtained to eval for H pylori..        Duodenum: severe bulb duodenitis with several clean based ulcers. D2 completely    normal..        Other Interventions:        Impressions:        -Esophagus: irregular Z line. .        Stomach: Two 1-2 mm clean based ulcers in the stomach antrum with surrounding    edema/hyperemia. Five cold forceps bx obtained to eval for H pylori. .        Duodenum: severe bulb duodenitis with several clean based ulcers. D2    completely normal. .        Plan:        Await pathology results.        Return to floor for further management. To consider alternative to ASA - would    plavix be adequate since ICS >1 y ago? Regular diet. PPI BID. Will need repeat    EGD in 1-2 mo to assess gastric ulcer healing.        Specimens:        Additional Notes:        Pathology:        Attending Participation:        Maribel Walls MD

## 2024-12-17 NOTE — DISCHARGE NOTE PROVIDER - PROVIDER TOKENS
PROVIDER:[TOKEN:[[id number]],FOLLOWUP:[1 week]],PROVIDER:[TOKEN:[[id number]],FOLLOWUP:[1 week]]

## 2024-12-17 NOTE — PROGRESS NOTE ADULT - ASSESSMENT
Patient is a 39y old  Male with PMH of chronic b/l otitis media, STEMI (2023) s/p stent, HLD, HTN, and DM, now presents to the ER for evaluation of acute dizziness. left ear tinnitus/fullness, and generalized weakness. On admission, he found to have no fever but tachycardia and Leukocytosis. The CXR  and urine analysis is negative, blood cultures and CT scan of ear/head is pending. He has started on Zosyn and IV Vancomycin, and the ID consult requested to assist with further evaluation and antibiotic management,    # Sepsis ( Tachycardia + Leukocytosis)   # Probable Left otitis media- CT scan is negative  # Acute blood loss    would recommend     1. Discontinue Cefepime since All Infectious work up is negative  2. Monitor H/H and transfuse as needed and GI evaluation   3.  Fall precautions    d/w Patient and Dr. Boateng    Attending Attestation:    Spent more than 35 minutes on total encounter, more than 50 % of the visit was spent counseling and/or coordinating care by the Attending physician.       Patient is a 39y old  Male with PMH of chronic b/l otitis media, STEMI (2023) s/p stent, HLD, HTN, and DM, now presents to the ER for evaluation of acute dizziness. left ear tinnitus/fullness, and generalized weakness. On admission, he found to have no fever but tachycardia and Leukocytosis. The CXR  and urine analysis is negative, blood cultures and CT scan of ear/head is pending. He has started on Zosyn and IV Vancomycin, and the ID consult requested to assist with further evaluation and antibiotic management,    # Sepsis ( Tachycardia + Leukocytosis) - resolved, Blood cultures have NGTD  # Probable Left otitis media- CT scan is negative  # Acute blood loss -  s/p EGD on 12/16 and shows irregular Z line, two 1-2 mm clean based ulcers in the stomach antrum with surrounding edema/hyperemia. Five cold forceps bx obtained to eval for H pylori, severe bulb duodenitis with several clean based ulcers.    would recommend     1. Discontinue Cefepime since All Infectious work up is negative  2. Monitor H/H and transfuse as needed   3. Follow up biopsy results  4. OOb to chair     d/w Patient and Dr. Boateng    Attending Attestation:    Spent more than 35 minutes on total encounter, more than 50 % of the visit was spent counseling and/or coordinating care by the Attending physician.

## 2024-12-17 NOTE — DISCHARGE NOTE PROVIDER - HOSPITAL COURSE
38yo, M, from home, with PMH of Chronic b/l otitis media, STEMI (2023) s/p stent, HLD, HTN, and DM.  Presenting with vertigo, left ear tinnitus/fullness, and generalized weakness.   Admitted to Telemetry for Severe sepsis 2/2 L ear infection, elevated troponin likely demand ischemia.   12/16 GI s/p EGD - Complete 12 weeks PPI daily if on asa. Avoid other nsaids. Repeat EGD in 12 weeks to assess for resolution of gastric ulcer. Okay to resume antiplatelet therapy. follow up with GI outpatient.  Cardiology consulted hold Plavix and rec's for d/c +++++++++++++    INCOMPLETE     Please note that this a brief summary of hospital course please refer to daily progress notes and consult notes for full course and events. Patient seen and examined at bedside, discussed with medical attending. Patient medically cleared for discharge to home. 40yo, M, from home, with PMH of Chronic b/l otitis media, STEMI (2023) s/p stent, HLD, HTN, and DM.  Presenting with vertigo, left ear tinnitus/fullness, and generalized weakness.  Admitted to Telemetry for Severe sepsis 2/2 L ear infection, elevated troponin likely demand ischemia.     12/16 GI s/p EGD - Complete 12 weeks PPI daily if on asa. Avoid other NSAIDs. Repeat EGD in 12 weeks to assess for resolution of gastric ulcer. Pt follow up with GI outpatient.  Per GI & Attending stop Plavix and Aspirin d/t UGIB.        THIS IS A BRIEF SUMMARY.  FOR A COMPLETE HOSPITAL COURSE SEE MEDICAL RECORDS OR [facility] PORTAL.    Patient is medically cleared by Attending for discharge. 40yo, M, from home, with PMH of Chronic b/l otitis media, STEMI (2023) s/p stent, HLD, HTN, and DM.  Presenting with vertigo, left ear tinnitus/fullness, and generalized weakness.  Admitted to Telemetry for Severe sepsis 2/2 L ear infection, elevated troponin likely demand ischemia.     12/16 GI s/p EGD - Complete 12 weeks PPI daily if on asa. Avoid other NSAIDs. Repeat EGD in 12 weeks to assess for resolution of gastric ulcer. Pt follow up with GI outpatient.  Plavix was stopped d/t UGIB.  Pt to continue on ASA per CV for CAD and recent stent.        THIS IS A BRIEF SUMMARY.  FOR A COMPLETE HOSPITAL COURSE SEE MEDICAL RECORDS OR [facility] PORTAL.    Patient is medically cleared by Attending for discharge.

## 2024-12-17 NOTE — DISCHARGE NOTE PROVIDER - NSDCFUADDINST_GEN_ALL_CORE_FT
Plavix was stopped d/t UGIB.  Pt to continue on ASA per CV for CAD and recent stent.  Plavix was stopped d/t UGIB.  Pt to continue on Aspirin per CV for CAD and recent stent.     Please follow up with PCP within 3d for continued monitoring while on Aspirin and off Plavix.      Please follow up w/ GI within 1 week for continued monitoring while on Aspirin and off Plavix.

## 2024-12-17 NOTE — PROGRESS NOTE ADULT - PROBLEM SELECTOR PLAN 2
- Likely demand ischemia  - C/w Telemetry  - CV-Dr. Meng
- Likely demand ischemia  - C/w Telemetry  - CV-Dr. Meng

## 2024-12-17 NOTE — PROGRESS NOTE ADULT - SUBJECTIVE AND OBJECTIVE BOX
HPI:  39y M with PMH of chronic b/l otitis media, STEMI (2023) s/p stent, HLD, HTN, and DM presenting with vertigo, left ear tinnitus/fullness, and generalized weakness. Patient states that yesterday he started feeling "bad", described as generalized weakness, and later suddenly developed dizziness described as head spinning while standing up in the bathroom. Says he immediately laid down on the floor and felt better. After lying on the floor for a few minutes he stood up but says he quickly developed the same head spinning sensation and instability. Says he has never experienced similar symptoms in the past and was worried symptoms were related to his heart given recent STEMI so he came to the ED. Endorses a ringing in his left ear as well as feeling of fullness in the left ear since yesterday however denies pain or discharge from the ear. Uses hearing aide in L ear only.  Patient has a history of frequent ear infections and states that in the past they have been associated pain and and fluid leaking from the ears. Per prior ENT notes in [facility] patient has known bilateral eustachian tube dysfunction and has been offered balloon dilation procedure in the past. Patient denies fever, chills, headache, chest pain, palpitations, shortness of breath, abdominal pain, nausea, vomiting, diarrhea, constipation, and dysuria. When asked if he thought his left face was swollen he said he had not noticed but when showed a picture of himself on cell phone he agreed that the left cheek and jaw appears more swollen than baseline.    (14 Dec 2024 06:53)      Patient is a 39y old  Male who presents with a chief complaint of acute vertigo w/ severe sepsis (17 Dec 2024 18:31)      INTERVAL HPI/OVERNIGHT EVENTS:  T(C): 36.3 (12-17-24 @ 13:34), Max: 36.8 (12-17-24 @ 00:42)  HR: 77 (12-17-24 @ 13:34) (63 - 96)  BP: 110/67 (12-17-24 @ 13:34) (90/59 - 110/67)  RR: 17 (12-17-24 @ 13:34) (17 - 18)  SpO2: 99% (12-17-24 @ 13:34) (96% - 99%)  Wt(kg): --  I&O's Summary      REVIEW OF SYSTEMS: denies fever, chills, SOB, palpitations, chest pain, abdominal pain, nausea, vomitting, diarrhea, constipation, dizziness    MEDICATIONS  (STANDING):  aspirin  chewable 81 milliGRAM(s) Oral daily  atorvastatin 40 milliGRAM(s) Oral at bedtime  influenza   Vaccine 0.5 milliLiter(s) IntraMuscular once  insulin lispro (ADMELOG) corrective regimen sliding scale   SubCutaneous three times a day before meals  insulin lispro (ADMELOG) corrective regimen sliding scale   SubCutaneous at bedtime  pantoprazole    Tablet 40 milliGRAM(s) Oral two times a day    MEDICATIONS  (PRN):  acetaminophen     Tablet .. 650 milliGRAM(s) Oral every 6 hours PRN Temp greater or equal to 38C (100.4F), Mild Pain (1 - 3)  meclizine 25 milliGRAM(s) Oral every 8 hours PRN Dizziness  ondansetron Injectable 4 milliGRAM(s) IV Push every 8 hours PRN Nausea and/or Vomiting      PHYSICAL EXAM:  GENERAL: NAD, well-groomed, well-developed  HEAD:  Atraumatic, Normocephalic  EYES: EOMI, PERRLA, conjunctiva and sclera clear  ENMT: No tonsillar erythema, exudates, or enlargement; Moist mucous membranes, Good dentition, No lesions  NECK: Supple, No JVD, Normal thyroid  NERVOUS SYSTEM:  Alert & Oriented X3, Good concentration; Motor Strength 5/5 B/L upper and lower extremities; DTRs 2+ intact and symmetric  CHEST/LUNG: Clear to percussion bilaterally; No rales, rhonchi, wheezing, or rubs  HEART: Regular rate and rhythm; No murmurs, rubs, or gallops  ABDOMEN: Soft, Nontender, Nondistended; Bowel sounds present  EXTREMITIES:  2+ Peripheral Pulses, No clubbing, cyanosis, or edema  LYMPH: No lymphadenopathy noted  SKIN: No rashes or lesions  LABS:                        9.6    7.99  )-----------( 162      ( 17 Dec 2024 06:25 )             27.4     12-17    142  |  113[H]  |  15  ----------------------------<  97  3.8   |  25  |  0.73    Ca    8.2[L]      17 Dec 2024 06:25  Phos  2.6     12-16  Mg     2.3     12-16    TPro  5.7[L]  /  Alb  3.1[L]  /  TBili  1.1  /  DBili  x   /  AST  21  /  ALT  34  /  AlkPhos  40  12-16    PT/INR - ( 16 Dec 2024 06:57 )   PT: 13.8 sec;   INR: 1.18 ratio         PTT - ( 16 Dec 2024 06:57 )  PTT:26.4 sec  Urinalysis Basic - ( 17 Dec 2024 06:25 )    Color: x / Appearance: x / SG: x / pH: x  Gluc: 97 mg/dL / Ketone: x  / Bili: x / Urobili: x   Blood: x / Protein: x / Nitrite: x   Leuk Esterase: x / RBC: x / WBC x   Sq Epi: x / Non Sq Epi: x / Bacteria: x      CAPILLARY BLOOD GLUCOSE      POCT Blood Glucose.: 86 mg/dL (17 Dec 2024 16:42)  POCT Blood Glucose.: 118 mg/dL (17 Dec 2024 11:20)  POCT Blood Glucose.: 108 mg/dL (17 Dec 2024 07:34)  POCT Blood Glucose.: 83 mg/dL (16 Dec 2024 21:57)        Urinalysis Basic - ( 17 Dec 2024 06:25 )    Color: x / Appearance: x / SG: x / pH: x  Gluc: 97 mg/dL / Ketone: x  / Bili: x / Urobili: x   Blood: x / Protein: x / Nitrite: x   Leuk Esterase: x / RBC: x / WBC x   Sq Epi: x / Non Sq Epi: x / Bacteria: x

## 2024-12-17 NOTE — DISCHARGE NOTE PROVIDER - NSDCCPCAREPLAN_GEN_ALL_CORE_FT
PRINCIPAL DISCHARGE DIAGNOSIS  Diagnosis: Elevated troponin  Assessment and Plan of Treatment: You arrived with an Elevated troponin level. You have a history of a Stemi in 2023 s/p stent placement and on plavix.   High troponin levels can be a sign of a heart attack or other heart damage. Troponins are muscle proteins, and some are only found in the heart muscle. Damage to the heart causes troponin to be released into the bloodstream. Blood tests check for troponin. You did not have any chest pain while you were in the hospital.  You were seen by a cardiologist whom recs+++++++      SECONDARY DISCHARGE DIAGNOSES  Diagnosis: HTN (hypertension)  Assessment and Plan of Treatment: You have a history of high blood pressure. High blood pressure is a condition that puts you at risk for heart attack, stroke and kidney disease. Please continue to take your medications as prescribed. You can also help control your blood pressure by maintaining a healthy weight, eating a diet low in fat and rich in fruits and vegetables, reduce the amount of salt in your diet. Also, reduce alcohol and try to include some form of physical activity daily for at least 30 mins. Follow up with your medical doctor to establish long term blood pressure treatment goals.  Notify your doctor if you have any of the following symptoms:   Dizziness, Lightheadedness, Blurry vision, Headache, Chest pain, Shortness of breath    Diagnosis: DM (diabetes mellitus)  Assessment and Plan of Treatment: Continue to follow with your primary care MD or your endocrinologist. Discuss what the goal hemoglobin A1C level is for you.  Follow a heart healthy diabetic diet. If you check your fingerstick glucose at home, call your MD if it is greater than 250mg/dL on 2 occasions or less than 100mg/dL on 2 occasions. Know signs of low blood sugar, such as: dizziness, shakiness, sweating, confusion, hunger, nervousness- drink 4 ounces apple juice if occurs and call your doctor. Know early signs of high blood sugar, such as: frequent urination, increased thirst, blurry vision, fatigue, headache - call your doctor if this occurs.    Diagnosis: HLD (hyperlipidemia)  Assessment and Plan of Treatment: You have a history of hyperlipidemia, which is when you have too much cholesterol in your blood. High amounts of cholesterol in your blood can put you at higher risks for heart attck, strokes and other health problems. Follow up with PCP for treatment goals, continue medication as prescribed, have liver function testing every 3 months as anti lipid medications can cause liver irritation, eat low fat meals, avoid red meat, butter, fried foods and cheese. Get daily exercise.    Diagnosis: CAD (coronary artery disease)  Assessment and Plan of Treatment: You have a history o fCoronary artery disease which  is a condition where the arteries the supply the heart muscle gets clogged with fatty deposits & puts you at risk for a heart attack. Continue with medications as prescribed.   Call your doctor if you have any new pain, pressure, or discomfort in the center of your chest, pain, tingling or discomfort in arms, back, neck, jaw, or stomach, shortness of breath, nausea, vomiting, burping or heartburn, sweating, cold and clammy skin, racing or abnormal heartbeat for more than 10 minutes or if they keep coming & going.  Call 911 and do not tr to get to hospital by care  You can help yourself with lifestyle changes (quitting smoking if you smoke), eat lots of fruits & vegetables & low fat dairy products, not a lot of meat & fatty foods, walk or some form of physical activity most days of the week, lose weight if you are overweight  Take your cardiac medication as prescribed to lower cholesterol, to lower blood pressure, aspirin to prevent blood clots, and diabetes control  Make sure to keep appointments with doctor for cardiac follow up care     PRINCIPAL DISCHARGE DIAGNOSIS  Diagnosis: Elevated troponin  Assessment and Plan of Treatment: You arrived with an Elevated troponin level. You have a history of a Stemi in 2023 s/p stent placement and on plavix.   High troponin levels can be a sign of a heart attack or other heart damage. Troponins are muscle proteins, and some are only found in the heart muscle. Damage to the heart causes troponin to be released into the bloodstream. Blood tests check for troponin. You did not have any chest pain while you were in the hospital.  You were seen by a cardiologist to continue aspirin.      SECONDARY DISCHARGE DIAGNOSES  Diagnosis: CAD (coronary artery disease)  Assessment and Plan of Treatment: You have a history o fCoronary artery disease which  is a condition where the arteries the supply the heart muscle gets clogged with fatty deposits & puts you at risk for a heart attack. Continue with medications as prescribed.   Call your doctor if you have any new pain, pressure, or discomfort in the center of your chest, pain, tingling or discomfort in arms, back, neck, jaw, or stomach, shortness of breath, nausea, vomiting, burping or heartburn, sweating, cold and clammy skin, racing or abnormal heartbeat for more than 10 minutes or if they keep coming & going.  Call 911 and do not tr to get to hospital by care  You can help yourself with lifestyle changes (quitting smoking if you smoke), eat lots of fruits & vegetables & low fat dairy products, not a lot of meat & fatty foods, walk or some form of physical activity most days of the week, lose weight if you are overweight  Take your cardiac medication as prescribed to lower cholesterol, to lower blood pressure, aspirin to prevent blood clots, and diabetes control  Make sure to keep appointments with doctor for cardiac follow up care    Diagnosis: HTN (hypertension)  Assessment and Plan of Treatment: You have a history of high blood pressure. High blood pressure is a condition that puts you at risk for heart attack, stroke and kidney disease. Please continue to take your medications as prescribed. You can also help control your blood pressure by maintaining a healthy weight, eating a diet low in fat and rich in fruits and vegetables, reduce the amount of salt in your diet. Also, reduce alcohol and try to include some form of physical activity daily for at least 30 mins. Follow up with your medical doctor to establish long term blood pressure treatment goals.  Notify your doctor if you have any of the following symptoms:   Dizziness, Lightheadedness, Blurry vision, Headache, Chest pain, Shortness of breath    Diagnosis: DM (diabetes mellitus)  Assessment and Plan of Treatment: Continue to follow with your primary care MD or your endocrinologist. Discuss what the goal hemoglobin A1C level is for you.  Follow a heart healthy diabetic diet. If you check your fingerstick glucose at home, call your MD if it is greater than 250mg/dL on 2 occasions or less than 100mg/dL on 2 occasions. Know signs of low blood sugar, such as: dizziness, shakiness, sweating, confusion, hunger, nervousness- drink 4 ounces apple juice if occurs and call your doctor. Know early signs of high blood sugar, such as: frequent urination, increased thirst, blurry vision, fatigue, headache - call your doctor if this occurs.    Diagnosis: HLD (hyperlipidemia)  Assessment and Plan of Treatment: You have a history of hyperlipidemia, which is when you have too much cholesterol in your blood. High amounts of cholesterol in your blood can put you at higher risks for heart attck, strokes and other health problems. Follow up with PCP for treatment goals, continue medication as prescribed, have liver function testing every 3 months as anti lipid medications can cause liver irritation, eat low fat meals, avoid red meat, butter, fried foods and cheese. Get daily exercise.

## 2024-12-17 NOTE — DISCHARGE NOTE PROVIDER - CARE PROVIDER_API CALL
Shante Meng  Cardiology  [address] [place] [address]  Phone: [phone]  Fax: [phone]  Follow Up Time: 1 week    Naina Catherine  Gastroenterology  [address] [place] [id number]  Phone: [phone]  Fax: [phone]  Follow Up Time: 1 week

## 2024-12-17 NOTE — PROGRESS NOTE ADULT - SUBJECTIVE AND OBJECTIVE BOX
INTERVAL HPI/OVERNIGHT EVENTS: No overnight events, patient hemoglobin is stable. Patient seen and examined at bedside. Patient reports feeling well, denies any nausea, vomiting, abdominal pain and/or changes in bowel movement. Patient inquired regarding endoscopy results, result provided to him, patient was advised to follow up w/ PCP and GI outpatient for continuos monitoring of ulcers and Hemoglobin.       MEDICATIONS  (STANDING):  atorvastatin 40 milliGRAM(s) Oral at bedtime  cefepime   IVPB      cefepime   IVPB 1000 milliGRAM(s) IV Intermittent every 8 hours  influenza   Vaccine 0.5 milliLiter(s) IntraMuscular once  insulin lispro (ADMELOG) corrective regimen sliding scale   SubCutaneous three times a day before meals  insulin lispro (ADMELOG) corrective regimen sliding scale   SubCutaneous at bedtime  pantoprazole    Tablet 40 milliGRAM(s) Oral two times a day    MEDICATIONS  (PRN):  acetaminophen     Tablet .. 650 milliGRAM(s) Oral every 6 hours PRN Temp greater or equal to 38C (100.4F), Mild Pain (1 - 3)  meclizine 25 milliGRAM(s) Oral every 8 hours PRN Dizziness  ondansetron Injectable 4 milliGRAM(s) IV Push every 8 hours PRN Nausea and/or Vomiting      REVIEW OF SYSTEMS:  CONSTITUTIONAL: No fever, chills  RESPIRATORY: No shortness of breath  CARDIOVASCULAR: No chest pain  GASTROINTESTINAL: No abdominal pain, nausea/vomiting or changes in BM   GENITOURINARY: No dysuria  NEUROLOGICAL: No headaches or dizziness     Vital Signs Last 24 Hrs  T(C): 36.6 (17 Dec 2024 05:00), Max: 36.8 (17 Dec 2024 00:42)  T(F): 97.8 (17 Dec 2024 05:00), Max: 98.3 (17 Dec 2024 00:42)  HR: 63 (17 Dec 2024 05:00) (63 - 104)  BP: 90/59 (17 Dec 2024 05:00) (90/59 - 129/81)  BP(mean): 69 (17 Dec 2024 05:00) (69 - 77)  RR: 17 (17 Dec 2024 05:00) (15 - 19)  SpO2: 98% (17 Dec 2024 05:00) (96% - 100%)    Parameters below as of 17 Dec 2024 05:00  Patient On (Oxygen Delivery Method): room air        PHYSICAL EXAMINATION:  GENERAL: NAD, sitting in bed, comfortable   EYES:  conjunctiva and sclera clear  CHEST/LUNG: Clear to auscultation B/L   HEART: Regular rate and rhythm; No murmurs appreciated   ABDOMEN: Soft, Nontender, Nondistended; Bowel sounds present  NERVOUS SYSTEM:  Alert & Oriented X3,    EXTREMITIES:  no LE edema noted                             9.6    7.99  )-----------( 162      ( 17 Dec 2024 06:25 )             27.4     12-17    142  |  113[H]  |  15  ----------------------------<  97  3.8   |  25  |  0.73    Ca    8.2[L]      17 Dec 2024 06:25  Phos  2.6     12-16  Mg     2.3     12-16    TPro  5.7[L]  /  Alb  3.1[L]  /  TBili  1.1  /  DBili  x   /  AST  21  /  ALT  34  /  AlkPhos  40  12-16    LIVER FUNCTIONS - ( 16 Dec 2024 06:57 )  Alb: 3.1 g/dL / Pro: 5.7 g/dL / ALK PHOS: 40 U/L / ALT: 34 U/L DA / AST: 21 U/L / GGT: x               PT/INR - ( 16 Dec 2024 06:57 )   PT: 13.8 sec;   INR: 1.18 ratio         PTT - ( 16 Dec 2024 06:57 )  PTT:26.4 sec    CAPILLARY BLOOD GLUCOSE      RADIOLOGY & ADDITIONAL TESTS:    < from: EGD (12.16.24 @ 13:50) >  Findings:        Esophagus Additional esophagus findings -Esophagus: irregular Z line..        Stomach Additional stomach findings Stomach: Two 1-2 mm clean based ulcers in    the stomach antrum with surrounding edema/hyperemia. Five cold forceps bx    obtained to eval for H pylori..        Duodenum: severe bulb duodenitis with several clean based ulcers. D2 completely    normal..    < end of copied text >                     INTERVAL HPI/OVERNIGHT EVENTS: No overnight events, patient hemoglobin is stable. Patient seen and examined at bedside. Patient reports feeling well, denies any nausea, vomiting, abdominal pain and/or changes in bowel movement. Patient inquired regarding endoscopy results, result provided to him, patient was advised to follow up w/ PCP and GI outpatient for continuos monitoring of ulcers and Hemoglobin.       MEDICATIONS  (STANDING):  atorvastatin 40 milliGRAM(s) Oral at bedtime  cefepime   IVPB      cefepime   IVPB 1000 milliGRAM(s) IV Intermittent every 8 hours  influenza   Vaccine 0.5 milliLiter(s) IntraMuscular once  insulin lispro (ADMELOG) corrective regimen sliding scale   SubCutaneous three times a day before meals  insulin lispro (ADMELOG) corrective regimen sliding scale   SubCutaneous at bedtime  pantoprazole    Tablet 40 milliGRAM(s) Oral two times a day    MEDICATIONS  (PRN):  acetaminophen     Tablet .. 650 milliGRAM(s) Oral every 6 hours PRN Temp greater or equal to 38C (100.4F), Mild Pain (1 - 3)  meclizine 25 milliGRAM(s) Oral every 8 hours PRN Dizziness  ondansetron Injectable 4 milliGRAM(s) IV Push every 8 hours PRN Nausea and/or Vomiting        Vital Signs Last 24 Hrs  T(C): 36.6 (17 Dec 2024 05:00), Max: 36.8 (17 Dec 2024 00:42)  T(F): 97.8 (17 Dec 2024 05:00), Max: 98.3 (17 Dec 2024 00:42)  HR: 63 (17 Dec 2024 05:00) (63 - 104)  BP: 90/59 (17 Dec 2024 05:00) (90/59 - 129/81)  BP(mean): 69 (17 Dec 2024 05:00) (69 - 77)  RR: 17 (17 Dec 2024 05:00) (15 - 19)  SpO2: 98% (17 Dec 2024 05:00) (96% - 100%)    Parameters below as of 17 Dec 2024 05:00  Patient On (Oxygen Delivery Method): room air        PHYSICAL EXAMINATION:  GENERAL: NAD, sitting in bed, comfortable   EYES:  conjunctiva and sclera clear  CHEST/LUNG: Clear to auscultation B/L   HEART: Regular rate and rhythm; No murmurs appreciated   ABDOMEN: Soft, Nontender, Nondistended; Bowel sounds present  NERVOUS SYSTEM:  Alert & Oriented X3,    EXTREMITIES:  no LE edema noted                             9.6    7.99  )-----------( 162      ( 17 Dec 2024 06:25 )             27.4     12-17    142  |  113[H]  |  15  ----------------------------<  97  3.8   |  25  |  0.73    Ca    8.2[L]      17 Dec 2024 06:25  Phos  2.6     12-16  Mg     2.3     12-16    TPro  5.7[L]  /  Alb  3.1[L]  /  TBili  1.1  /  DBili  x   /  AST  21  /  ALT  34  /  AlkPhos  40  12-16    LIVER FUNCTIONS - ( 16 Dec 2024 06:57 )  Alb: 3.1 g/dL / Pro: 5.7 g/dL / ALK PHOS: 40 U/L / ALT: 34 U/L DA / AST: 21 U/L / GGT: x               PT/INR - ( 16 Dec 2024 06:57 )   PT: 13.8 sec;   INR: 1.18 ratio         PTT - ( 16 Dec 2024 06:57 )  PTT:26.4 sec    CAPILLARY BLOOD GLUCOSE      RADIOLOGY & ADDITIONAL TESTS:    < from: EGD (12.16.24 @ 13:50) >  Findings:        Esophagus Additional esophagus findings -Esophagus: irregular Z line..        Stomach Additional stomach findings Stomach: Two 1-2 mm clean based ulcers in    the stomach antrum with surrounding edema/hyperemia. Five cold forceps bx    obtained to eval for H pylori..        Duodenum: severe bulb duodenitis with several clean based ulcers. D2 completely    normal..    < end of copied text >

## 2024-12-17 NOTE — PROGRESS NOTE ADULT - PROBLEM SELECTOR PLAN 4
- H/o Chronic otitis media complaining of L ear tinnitus/fullness but no pain  - P/w Acute onset spinning sensation and weakness worse with standing and improved by lying still  - S/p CT IAC tympanic membrane not inflamed without effusion or perforation on otoscopic exam  - Holding Neuro consult d/t low concern for central vertigo.  Consider MR IAC if sxms recur  - C/w Supportive care
- H/o Chronic otitis media complaining of L ear tinnitus/fullness but no pain  - P/w Acute onset spinning sensation and weakness worse with standing and improved by lying still  - S/p CT IAC tympanic membrane not inflamed without effusion or perforation on otoscopic exam  - Holding Neuro consult d/t low concern for central vertigo.  Consider MR IAC if sxms recur  - C/w Supportive care

## 2024-12-17 NOTE — PROGRESS NOTE ADULT - ASSESSMENT
39y M with PMH of chronic b/l otitis media, STEMI (2023 on ASA/plavix since Nov 2023) s/p stent, HLD, HTN, and DM presenting with vertigo, left ear tinnitus/fullness, and generalized weakness. Hb drop 4 g, also dark stool. On PPI IV BID. s/p  2 U PRBC. GI consulted given concern for UGIB, pt denies NSAID use and has been on ASA/plavix >1 y. s/p EGD  w/ stomach ulcers and Bulb duodenitis, pending H. Pyloric studies.     Plan  -  39y M with PMH of chronic b/l otitis media, STEMI (2023 on ASA/plavix since Nov 2023) s/p stent, HLD, HTN, and DM presenting with vertigo, left ear tinnitus/fullness, and generalized weakness. Hb drop 4 g, also dark stool. On PPI IV BID. s/p  2 U PRBC. GI consulted given concern for UGIB, pt denies NSAID use and has been on ASA/plavix >1 y. s/p EGD  w/ stomach ulcers and Bulb duodenitis, pending H. Pyloric studies.

## 2024-12-17 NOTE — PROGRESS NOTE ADULT - ASSESSMENT
38yo, M, from home, with PMH of Chronic b/l otitis media, STEMI (2023) s/p stent, HLD, HTN, and DM.  Presenting with vertigo, left ear tinnitus/fullness, and generalized weakness.   Admitted to Telemetry for Severe sepsis 2/2 L ear infection, elevated troponin likely demand ischemia.   12/16 GI s/p EGD - Plavix and ASA d/c'ed due to stomach ulcers, f/u biopsy to r/o H pylori repeat EGD in 1-2 mo to assess gastric ulcer healing 38yo, M, from home, with PMH of Chronic b/l otitis media, STEMI (2023) s/p stent, HLD, HTN, and DM.  Presenting with vertigo, left ear tinnitus/fullness, and generalized weakness.   Admitted to Telemetry for Severe sepsis 2/2 L ear infection, elevated troponin likely demand ischemia.   12/16 GI s/p EGD - Complete 12 weeks PPI daily if on asa. Avoid other nsaids. Repeat EGD in 12 weeks to assess for resolution of gastric ulcer. Okay to resume antiplatelet therapy. follow up with GI outpatient.  Cardiology consulted hold Plavix

## 2024-12-17 NOTE — PROGRESS NOTE ADULT - PROBLEM SELECTOR PLAN 3
- On admission WBC 17.81,  HR , Lactate 2.5 met severe sepsis criteria with suspected source L ear infection  - S/p CXR   - S/p IVF & Vanco    - C/w VCefepime   - ID-Dr. Bolanos

## 2024-12-17 NOTE — PROGRESS NOTE ADULT - ASSESSMENT
39y M with PMH of chronic b/l otitis media, STEMI (2023) s/p stent at [facility] , HLD, HTN, and DM presenting with vertigo, left ear tinnitus/fullness, and generalized weakness. Patient states that yesterday he started feeling "bad", described as generalized weakness, and later suddenly developed dizziness described as head spinning while standing up in the bathroom,sespsis. and mild elevated troponins,now GI bleed with acute anemia,PUD  1.PUD-PPI  2.CAD-resume asa if ok with GI,hold plavix,cont statin-hold b blocker.  3.GI bleed with acute anemia due to PUD.  4.DM-Insulin.  5.Lipid d/o-statin.  6.Echocardiogram.  7.PPI.

## 2024-12-17 NOTE — PROGRESS NOTE ADULT - PROBLEM/PLAN-3
DISPLAY PLAN FREE TEXT
DISPLAY PLAN FREE TEXT
80-year-old Bulgarian speaking  female from home, ambulates with a walker at baseline, with HHA 10 hours/day x 7 days with PMH of HTN, HLD, depression and Alzheimer's dementia   presents to the ED complaining of left-sided rib cage pain s/p a fall out of bed 4 days ago. Patient informs that she is able to walk and carry out other activities but continues to have left sided chest wall pain. She also c/o having cough productive of whitish sputum and shortness of breath, denies any fever, chills, nausea, vomiting, abdominal pain or other complaints. She has seasonal allergies, no NKDA.     In ED, patient's VS were stable. She underwent CT chest which showed pneumonia, hence was given IV Rocephin and Zithromax. She was also given morphine 2mg IV push for pain control.  ID - [id number].     Labs reviewed- cbc, bmp, CPK, CE    PE as above   tenderness on left lateral chest wall  b/l diffuse crackles   no pedal edema     A/P:  #Multi-focal pneumonia  #Rib fx- 8th-10th   #HTN  #HLD  #Dementia   #DVT ppx      Plan:  -patient p/w cough, sob- CT chest showed- Multifocal subsegmental consolidative changes with more prominent changes in the left lower lobe with associated areas of mucous plugging.   -Start IV Rocephin x5-7  and Zithromax x3 days    -Check sputum cx, legionella, strept pne, mycoplasma   -Mucinex for mucolytic effect   -Pain control for rib fx, incentive spirometer   -Patient is unsure of her home meds, reports being on metoprolol and statin. Resume low dose B-Blocker   -resume statin   -Will reach out to PCP's office in am to get med list, she can't recall her pharmacy's name. PCP. Dr. Man Hong [facility]

## 2024-12-17 NOTE — PROGRESS NOTE ADULT - SUBJECTIVE AND OBJECTIVE BOX
Patient is seen and examined at the bed side, is afebrile. He is s/p EGD on 12/16 and shows irregular Z line, two 1-2 mm clean based ulcers in the stomach antrum with surrounding edema/hyperemia. Five cold forceps bx obtained to eval for H pylori, severe bulb duodenitis with several clean based ulcers.      REVIEW OF SYSTEMS: All other review systems are negative      ALLERGIES: No Known Allergies      Vital Signs Last 24 Hrs  T(C): 36.6 (17 Dec 2024 05:00), Max: 36.8 (17 Dec 2024 00:42)  T(F): 97.8 (17 Dec 2024 05:00), Max: 98.3 (17 Dec 2024 00:42)  HR: 63 (17 Dec 2024 05:00) (63 - 104)  BP: 90/59 (17 Dec 2024 05:00) (90/59 - 129/81)  BP(mean): 69 (17 Dec 2024 05:00) (69 - 77)  RR: 17 (17 Dec 2024 05:00) (15 - 19)  SpO2: 98% (17 Dec 2024 05:00) (96% - 100%)    Parameters below as of 17 Dec 2024 05:00  Patient On (Oxygen Delivery Method): room air        PHYSICAL EXAM:  GENERAL: Not in distress   HEENT: Left ear has hearing aid, but no discharge noted  CHEST/LUNG:  Air entry bilaterally  HEART: s1 and s2 present  ABDOMEN:  Nontender and  Nondistended  EXTREMITIES: No pedal  edema  CNS: Awake and Alert      LABS:                          9.6    7.99  )-----------( 162      ( 17 Dec 2024 06:25 )             27.4                           9.8    9.75  )-----------( 147      ( 16 Dec 2024 06:57 )             28.0         12-17    142  |  113[H]  |  15  ----------------------------<  97  3.8   |  25  |  0.73    Ca    8.2[L]      17 Dec 2024 06:25  Phos  2.6     12-16  Mg     2.3     12-16    TPro  5.7[L]  /  Alb  3.1[L]  /  TBili  1.1  /  DBili  x   /  AST  21  /  ALT  34  /  AlkPhos  40  12-16    12-16    142  |  111[H]  |  12  ----------------------------<  129[H]  3.6   |  25  |  0.71    Ca    8.5      16 Dec 2024 06:57  Phos  2.6     12-16  Mg     2.3     12-16    TPro  5.7[L]  /  Alb  3.1[L]  /  TBili  1.1  /  DBili  x   /  AST  21  /  ALT  34  /  AlkPhos  40  12-16    PT/INR - ( 14 Dec 2024 05:00 )   PT: 13.3 sec;   INR: 1.15 ratio      PTT - ( 14 Dec 2024 05:00 )  PTT:24.2 sec        CAPILLARY BLOOD GLUCOSE  POCT Blood Glucose.: 187 mg/dL (14 Dec 2024 12:02)  POCT Blood Glucose.: 122 mg/dL (14 Dec 2024 07:20)        MEDICATIONS  (STANDING):    atorvastatin 40 milliGRAM(s) Oral at bedtime  cefepime   IVPB      cefepime   IVPB 1000 milliGRAM(s) IV Intermittent every 8 hours  influenza   Vaccine 0.5 milliLiter(s) IntraMuscular once  insulin lispro (ADMELOG) corrective regimen sliding scale   SubCutaneous three times a day before meals  insulin lispro (ADMELOG) corrective regimen sliding scale   SubCutaneous at bedtime  pantoprazole    Tablet 40 milliGRAM(s) Oral two times a day      RADIOLOGY & ADDITIONAL TESTS:    12/14/24 : CT Head No Cont (12.14.24 @ 14:22) IMPRESSION: No acute intracranial hemorrhage, mass effect, or shift of the midline structures.    12/13/24 : Xray Chest 1 View-PORTABLE IMMEDIATE (Xray Chest 1 View-PORTABLE IMMEDIATE .) (12.13.24 @ 23:54) The heart is normal in size.    The lungs are clear. No pneumothorax. No pleural effusion.  No acute osseous abnormalities.      MICROBIOLOGY DATA:      Culture - Blood (12.14.24 @ 05:00)   Specimen Source: .Blood BLOOD  Culture Results: No growth at 48 Hours    Culture - Blood (12.14.24 @ 04:50)   Specimen Source: .Blood BLOOD  Culture Results: No growth at 48 Hours             Patient is seen and examined at the bed side, is afebrile. He is s/p EGD on 12/16 and shows irregular Z line, two 1-2 mm clean based ulcers in the stomach antrum with surrounding edema/hyperemia. Five cold forceps bx obtained to eval for H pylori, severe bulb duodenitis with several clean based ulcers.      REVIEW OF SYSTEMS: All other review systems are negative      ALLERGIES: No Known Allergies      Vital Signs Last 24 Hrs  T(C): 36.6 (17 Dec 2024 05:00), Max: 36.8 (17 Dec 2024 00:42)  T(F): 97.8 (17 Dec 2024 05:00), Max: 98.3 (17 Dec 2024 00:42)  HR: 63 (17 Dec 2024 05:00) (63 - 104)  BP: 90/59 (17 Dec 2024 05:00) (90/59 - 129/81)  BP(mean): 69 (17 Dec 2024 05:00) (69 - 77)  RR: 17 (17 Dec 2024 05:00) (15 - 19)  SpO2: 98% (17 Dec 2024 05:00) (96% - 100%)    Parameters below as of 17 Dec 2024 05:00  Patient On (Oxygen Delivery Method): room air      PHYSICAL EXAM:  GENERAL: Not in distress   HEENT: Left ear has hearing aid, but no discharge noted  CHEST/LUNG:  Air entry bilaterally  HEART: s1 and s2 present  ABDOMEN:  Nontender and  Nondistended  EXTREMITIES: No pedal  edema  CNS: Awake and Alert      LABS:                          9.6    7.99  )-----------( 162      ( 17 Dec 2024 06:25 )             27.4                           9.8    9.75  )-----------( 147      ( 16 Dec 2024 06:57 )             28.0         12-17    142  |  113[H]  |  15  ----------------------------<  97  3.8   |  25  |  0.73    Ca    8.2[L]      17 Dec 2024 06:25  Phos  2.6     12-16  Mg     2.3     12-16    TPro  5.7[L]  /  Alb  3.1[L]  /  TBili  1.1  /  DBili  x   /  AST  21  /  ALT  34  /  AlkPhos  40  12-16    12-16    142  |  111[H]  |  12  ----------------------------<  129[H]  3.6   |  25  |  0.71    Ca    8.5      16 Dec 2024 06:57  Phos  2.6     12-16  Mg     2.3     12-16    TPro  5.7[L]  /  Alb  3.1[L]  /  TBili  1.1  /  DBili  x   /  AST  21  /  ALT  34  /  AlkPhos  40  12-16    PT/INR - ( 14 Dec 2024 05:00 )   PT: 13.3 sec;   INR: 1.15 ratio      PTT - ( 14 Dec 2024 05:00 )  PTT:24.2 sec        CAPILLARY BLOOD GLUCOSE  POCT Blood Glucose.: 187 mg/dL (14 Dec 2024 12:02)  POCT Blood Glucose.: 122 mg/dL (14 Dec 2024 07:20)        MEDICATIONS  (STANDING):    atorvastatin 40 milliGRAM(s) Oral at bedtime  cefepime   IVPB      cefepime   IVPB 1000 milliGRAM(s) IV Intermittent every 8 hours  influenza   Vaccine 0.5 milliLiter(s) IntraMuscular once  insulin lispro (ADMELOG) corrective regimen sliding scale   SubCutaneous three times a day before meals  insulin lispro (ADMELOG) corrective regimen sliding scale   SubCutaneous at bedtime  pantoprazole    Tablet 40 milliGRAM(s) Oral two times a day      RADIOLOGY & ADDITIONAL TESTS:    12/14/24 : CT Head No Cont (12.14.24 @ 14:22) IMPRESSION: No acute intracranial hemorrhage, mass effect, or shift of the midline structures.    12/13/24 : Xray Chest 1 View-PORTABLE IMMEDIATE (Xray Chest 1 View-PORTABLE IMMEDIATE .) (12.13.24 @ 23:54) The heart is normal in size.    The lungs are clear. No pneumothorax. No pleural effusion.  No acute osseous abnormalities.      MICROBIOLOGY DATA:      Culture - Blood (12.14.24 @ 05:00)   Specimen Source: .Blood BLOOD  Culture Results: No growth at 72 Hours    Culture - Blood (12.14.24 @ 04:50)   Specimen Source: .Blood BLOOD  Culture Results: No growth at 72 Hours

## 2024-12-17 NOTE — PROGRESS NOTE ADULT - PROBLEM SELECTOR PLAN 5
H/o STEMI in 2023 s/p stent on ASA, Atorvastatin, Metoprolol and Plavix  - Currently holding meds iso EGD-See above for ASA per GI ? Plavix    - Resume meds accordingly

## 2024-12-17 NOTE — PROGRESS NOTE ADULT - PROBLEM SELECTOR PLAN 1
- S/p EGD today, showed irregular Z line, two 1-2 mm clean based ulcers in the stomach antrum with surrounding edema/hyperemia. Five cold forceps bx obtained to eval for H pylori, severe bulb duodenitis with several clean based ulcers.  - Per GI, consider alternative to ASA. Pt will need a repeat EGD in 1-2 mo to assess gastric ulcer healing.  - Resumed regular diet.   - C/w PPI BID

## 2024-12-17 NOTE — PROGRESS NOTE ADULT - SUBJECTIVE AND OBJECTIVE BOX
NP Note discussed with  Primary Attending    Patient is a 39y old  Male who presents with a chief complaint of acute vertigo w/ severe sepsis (17 Dec 2024 09:51)      INTERVAL HPI/OVERNIGHT EVENTS: no new complaints    MEDICATIONS  (STANDING):  atorvastatin 40 milliGRAM(s) Oral at bedtime  influenza   Vaccine 0.5 milliLiter(s) IntraMuscular once  insulin lispro (ADMELOG) corrective regimen sliding scale   SubCutaneous three times a day before meals  insulin lispro (ADMELOG) corrective regimen sliding scale   SubCutaneous at bedtime  pantoprazole    Tablet 40 milliGRAM(s) Oral two times a day    MEDICATIONS  (PRN):  acetaminophen     Tablet .. 650 milliGRAM(s) Oral every 6 hours PRN Temp greater or equal to 38C (100.4F), Mild Pain (1 - 3)  meclizine 25 milliGRAM(s) Oral every 8 hours PRN Dizziness  ondansetron Injectable 4 milliGRAM(s) IV Push every 8 hours PRN Nausea and/or Vomiting      __________________________________________________  REVIEW OF SYSTEMS:    CONSTITUTIONAL: No fever,   EYES: no acute visual disturbances  NECK: No pain or stiffness  RESPIRATORY: No cough; No shortness of breath  CARDIOVASCULAR: No chest pain, no palpitations  GASTROINTESTINAL: No pain. No nausea or vomiting; No diarrhea   NEUROLOGICAL: No headache or numbness, no tremors  MUSCULOSKELETAL: No joint pain, no muscle pain  GENITOURINARY: no dysuria, no frequency, no hesitancy  PSYCHIATRY: no depression , no anxiety  ALL OTHER  ROS negative        Vital Signs Last 24 Hrs  T(C): 36.4 (17 Dec 2024 10:12), Max: 36.8 (17 Dec 2024 00:42)  T(F): 97.5 (17 Dec 2024 10:12), Max: 98.3 (17 Dec 2024 00:42)  HR: 81 (17 Dec 2024 10:12) (63 - 104)  BP: 100/61 (17 Dec 2024 10:12) (90/59 - 129/81)  BP(mean): 69 (17 Dec 2024 05:00) (69 - 77)  RR: 18 (17 Dec 2024 10:12) (15 - 19)  SpO2: 99% (17 Dec 2024 10:12) (96% - 100%)    Parameters below as of 17 Dec 2024 10:12  Patient On (Oxygen Delivery Method): room air        ________________________________________________  PHYSICAL EXAM:  GENERAL: NAD  HEENT: Normocephalic;  conjunctivae and sclerae clear; moist mucous membranes;   NECK : supple  CHEST/LUNG: Clear to auscultation bilaterally with good air entry   HEART: S1 S2  regular; no murmurs, gallops or rubs  ABDOMEN: Soft, Nontender, Nondistended; Bowel sounds present  EXTREMITIES: no cyanosis; no edema; no calf tenderness  SKIN: warm and dry; no rash  NERVOUS SYSTEM:  Awake and alert; Oriented  to place, person and time ; no new deficits    _________________________________________________  LABS:                        9.6    7.99  )-----------( 162      ( 17 Dec 2024 06:25 )             27.4     12-17    142  |  113[H]  |  15  ----------------------------<  97  3.8   |  25  |  0.73    Ca    8.2[L]      17 Dec 2024 06:25  Phos  2.6     12-16  Mg     2.3     12-16    TPro  5.7[L]  /  Alb  3.1[L]  /  TBili  1.1  /  DBili  x   /  AST  21  /  ALT  34  /  AlkPhos  40  12-16    PT/INR - ( 16 Dec 2024 06:57 )   PT: 13.8 sec;   INR: 1.18 ratio         PTT - ( 16 Dec 2024 06:57 )  PTT:26.4 sec  Urinalysis Basic - ( 17 Dec 2024 06:25 )    Color: x / Appearance: x / SG: x / pH: x  Gluc: 97 mg/dL / Ketone: x  / Bili: x / Urobili: x   Blood: x / Protein: x / Nitrite: x   Leuk Esterase: x / RBC: x / WBC x   Sq Epi: x / Non Sq Epi: x / Bacteria: x      CAPILLARY BLOOD GLUCOSE      POCT Blood Glucose.: 118 mg/dL (17 Dec 2024 11:20)  POCT Blood Glucose.: 108 mg/dL (17 Dec 2024 07:34)  POCT Blood Glucose.: 83 mg/dL (16 Dec 2024 21:57)  POCT Blood Glucose.: 101 mg/dL (16 Dec 2024 16:52)        RADIOLOGY & ADDITIONAL TESTS:  < from: CT Head No Cont (12.14.24 @ 14:22) >    ACC: [id number] EXAM:  CT BRAIN   ORDERED BY: TERESE ROCHA     PROCEDURE DATE:  12/14/2024          INTERPRETATION:  .    CLINICAL INFORMATION: Dizziness.    TECHNIQUE: Multiple axial CT images of the head were obtained without   contrast. [place] coronal reconstructed images were acquired from   the source data.    COMPARISON: No prior CT studies of the brain are available for comparison   at this institution.    FINDINGS: There is no acute intracranial hemorrhage, mass effect, shift   ofthe midline structures, herniation, extra-axial fluid collection, or   hydrocephalus.    The paranasal sinuses are clear. The orbits are unremarkable. The   calvarium is intact.    IMPRESSION: No acute intracranial hemorrhage, mass effect, or shift of   the midline structures.    --- End of Report ---            MIGUEL ERNANDEZ MD; Attending Radiologist  This document has been electronically signed. Dec 14 2024  4:12PM    < end of copied text >  < from: CT Internal Auditory Canals w/ IV Cont (12.14.24 @ 07:53) >    ACC: [id number] EXAM:  CT IAC IC   ORDERED BY: KARSTEN ROBERSON     PROCEDURE DATE:  12/14/2024          INTERPRETATION:  CT EXAMINATION OF THE TEMPORAL BONES    CLINICAL INDICATION: Rule out left inner ear/middle ear infection.   Chronic bilateral otitis media presents with sepsis, vertigo, left   tinnitus, left ear fullness.    TECHNIQUE: Multidetector axial CT images of the temporal bones were   obtained postcontrast. Multiplanar reformats were obtained. 90 cc   Omnipaque 350 IV contrast administered.    COMPARISON: CT temporal bones 4/27/2023.    FINDINGS:    RIGHT:  Mastoid air cells: Mastoid air cells are underdeveloped. There is   complete opacification, similar to prior study. There are foci of   thinning of the tegmen mastoideum, similar to prior study.    Outer ear: The external auditory canal appears normal.   Retraction   versus perforation of the tympanic membrane.    Middle ear: Partial opacification of the middle ear particularly the   epitympanum. The incus long process and stapes are not well-visualized.   The tegmen tympani is intact.    Inner ear: The otic capsule and inner ear structures appear intact. The   vestibular aqueduct is normal in size.    Facial nerve canal: Normal course and caliber.    Vascular structures:  The sigmoid plate is intact.  The carotid canal is   covered by bone.    Internal auditory canal: Normal in size      LEFT:  Mastoid air cells:  Underdeveloped and opacified similar to prior study.   Multiple foci of thinning are again seen.    Outer ear:  The external auditory canal appears normal.   Retraction   versus perforation of the tympanic membrane.    Middle ear: There is partial opacification of the middle ear particularly   the epitympanum. Erosions of the incus body are again suggested. The   tegmen tympani is intact.    Inner ear: The otic capsule and inner ear structures appear intact. The   vestibular aqueduct is normal in size.    Facial nerve canal: Normal course and caliber.    Vascular structures:  The sigmoid plate is intact.  The carotid canal is   covered by bone.    Internal auditory canal: Normal in size      Other:  Partially visualized intracranial structures: Normal.  Partially visualizes orbits: Unremarkable.  Partially visualized paranasal sinuses: Small mucous retention cysts in   the maxillary sinuses.    IMPRESSION:    Right:  -Partial opacification of the right middle ear and complete right mastoid   opacification, similar prior study.  -Nonvisualization of the right incus long process.    Left:  -Partial opacification of the left middle ear and complete left mastoid   opacification, similar to prior study.  -Possible erosions of the incus body again seen.    Findings may be related to chronic infectious/inflammatory process.   Underlying cholesteatoma cannot be ruled out. Consider MRI if clinical   concern persists.    --- End of Report ---            GONZALEZ ESTEBAN MD; Attending Radiologist  This document has been electronically signed. Dec 15 2024  4:15PM    < end of copied text >    Imaging Personally Reviewed:  YES    Consultant(s) Notes Reviewed:   YES    Plan of care was discussed with patient and /or primary care giver; all questions and concerns were addressed and care was aligned with patient's wishes.

## 2024-12-17 NOTE — PROGRESS NOTE ADULT - NS ATTEND AMEND GEN_ALL_CORE FT
Her/She
Patient seen and examined at bedside. Patient tolerated a regular diet. Labs and EGD reviewed. Complete 12 weeks of BID PPI. Then daily if on asa. Avoid other nsaids. Repeat EGD in 12 weeks to assess for resolution of gastric ulcer. Okay to resume antiplatelet therapy. Please have patient follow up with GI as an outpatient.

## 2024-12-17 NOTE — PROGRESS NOTE ADULT - ASSESSMENT
seen and examiend vsstable afebrile physical done denies cp or sob or palp  no abd pain  no anusea or vomiting  no rectal bleed   abd non tenderness    labs s/p EGD  gastric duodenal non bleeding ulcers  severe duodenitis   a/p GIB sec to plavix asa   hold    watch hgb   GI clearance for dc  out pt protonix  GI  card    cbc out pt also

## 2024-12-18 VITALS
OXYGEN SATURATION: 100 % | DIASTOLIC BLOOD PRESSURE: 63 MMHG | SYSTOLIC BLOOD PRESSURE: 99 MMHG | HEART RATE: 80 BPM | RESPIRATION RATE: 18 BRPM | TEMPERATURE: 98 F

## 2024-12-18 LAB
ALBUMIN SERPL ELPH-MCNC: 3.2 G/DL — LOW (ref 3.5–5)
ALP SERPL-CCNC: 50 U/L — SIGNIFICANT CHANGE UP (ref 40–120)
ALT FLD-CCNC: 43 U/L DA — SIGNIFICANT CHANGE UP (ref 10–60)
ANION GAP SERPL CALC-SCNC: 3 MMOL/L — LOW (ref 5–17)
AST SERPL-CCNC: 18 U/L — SIGNIFICANT CHANGE UP (ref 10–40)
BILIRUB SERPL-MCNC: 0.9 MG/DL — SIGNIFICANT CHANGE UP (ref 0.2–1.2)
BUN SERPL-MCNC: 13 MG/DL — SIGNIFICANT CHANGE UP (ref 7–18)
CALCIUM SERPL-MCNC: 8.5 MG/DL — SIGNIFICANT CHANGE UP (ref 8.4–10.5)
CHLORIDE SERPL-SCNC: 111 MMOL/L — HIGH (ref 96–108)
CO2 SERPL-SCNC: 27 MMOL/L — SIGNIFICANT CHANGE UP (ref 22–31)
CREAT SERPL-MCNC: 0.74 MG/DL — SIGNIFICANT CHANGE UP (ref 0.5–1.3)
EGFR: 117 ML/MIN/1.73M2 — SIGNIFICANT CHANGE UP
GLUCOSE BLDC GLUCOMTR-MCNC: 107 MG/DL — HIGH (ref 70–99)
GLUCOSE BLDC GLUCOMTR-MCNC: 98 MG/DL — SIGNIFICANT CHANGE UP (ref 70–99)
GLUCOSE SERPL-MCNC: 103 MG/DL — HIGH (ref 70–99)
HCT VFR BLD CALC: 29.6 % — LOW (ref 39–50)
HGB BLD-MCNC: 10.2 G/DL — LOW (ref 13–17)
MCHC RBC-ENTMCNC: 30.1 PG — SIGNIFICANT CHANGE UP (ref 27–34)
MCHC RBC-ENTMCNC: 34.5 G/DL — SIGNIFICANT CHANGE UP (ref 32–36)
MCV RBC AUTO: 87.3 FL — SIGNIFICANT CHANGE UP (ref 80–100)
NRBC # BLD: 0 /100 WBCS — SIGNIFICANT CHANGE UP (ref 0–0)
PLATELET # BLD AUTO: 194 K/UL — SIGNIFICANT CHANGE UP (ref 150–400)
POTASSIUM SERPL-MCNC: 3.5 MMOL/L — SIGNIFICANT CHANGE UP (ref 3.5–5.3)
POTASSIUM SERPL-SCNC: 3.5 MMOL/L — SIGNIFICANT CHANGE UP (ref 3.5–5.3)
PROT SERPL-MCNC: 6 G/DL — SIGNIFICANT CHANGE UP (ref 6–8.3)
RBC # BLD: 3.39 M/UL — LOW (ref 4.2–5.8)
RBC # FLD: 15 % — HIGH (ref 10.3–14.5)
SODIUM SERPL-SCNC: 141 MMOL/L — SIGNIFICANT CHANGE UP (ref 135–145)
WBC # BLD: 7.88 K/UL — SIGNIFICANT CHANGE UP (ref 3.8–10.5)
WBC # FLD AUTO: 7.88 K/UL — SIGNIFICANT CHANGE UP (ref 3.8–10.5)

## 2024-12-18 PROCEDURE — 70481 CT ORBIT/EAR/FOSSA W/DYE: CPT | Mod: MC

## 2024-12-18 PROCEDURE — 97162 PT EVAL MOD COMPLEX 30 MIN: CPT

## 2024-12-18 PROCEDURE — 80202 ASSAY OF VANCOMYCIN: CPT

## 2024-12-18 PROCEDURE — 84100 ASSAY OF PHOSPHORUS: CPT

## 2024-12-18 PROCEDURE — 83036 HEMOGLOBIN GLYCOSYLATED A1C: CPT

## 2024-12-18 PROCEDURE — 71045 X-RAY EXAM CHEST 1 VIEW: CPT

## 2024-12-18 PROCEDURE — 86850 RBC ANTIBODY SCREEN: CPT

## 2024-12-18 PROCEDURE — 86900 BLOOD TYPING SEROLOGIC ABO: CPT

## 2024-12-18 PROCEDURE — 83605 ASSAY OF LACTIC ACID: CPT

## 2024-12-18 PROCEDURE — 88305 TISSUE EXAM BY PATHOLOGIST: CPT

## 2024-12-18 PROCEDURE — 84443 ASSAY THYROID STIM HORMONE: CPT

## 2024-12-18 PROCEDURE — 85730 THROMBOPLASTIN TIME PARTIAL: CPT

## 2024-12-18 PROCEDURE — 96374 THER/PROPH/DIAG INJ IV PUSH: CPT

## 2024-12-18 PROCEDURE — 96375 TX/PRO/DX INJ NEW DRUG ADDON: CPT

## 2024-12-18 PROCEDURE — 80053 COMPREHEN METABOLIC PANEL: CPT

## 2024-12-18 PROCEDURE — P9040: CPT

## 2024-12-18 PROCEDURE — 82553 CREATINE MB FRACTION: CPT

## 2024-12-18 PROCEDURE — 86901 BLOOD TYPING SEROLOGIC RH(D): CPT

## 2024-12-18 PROCEDURE — 85027 COMPLETE CBC AUTOMATED: CPT

## 2024-12-18 PROCEDURE — 84484 ASSAY OF TROPONIN QUANT: CPT

## 2024-12-18 PROCEDURE — 85045 AUTOMATED RETICULOCYTE COUNT: CPT

## 2024-12-18 PROCEDURE — 86923 COMPATIBILITY TEST ELECTRIC: CPT

## 2024-12-18 PROCEDURE — 85610 PROTHROMBIN TIME: CPT

## 2024-12-18 PROCEDURE — 84439 ASSAY OF FREE THYROXINE: CPT

## 2024-12-18 PROCEDURE — 36415 COLL VENOUS BLD VENIPUNCTURE: CPT

## 2024-12-18 PROCEDURE — 85379 FIBRIN DEGRADATION QUANT: CPT

## 2024-12-18 PROCEDURE — 99285 EMERGENCY DEPT VISIT HI MDM: CPT

## 2024-12-18 PROCEDURE — 82550 ASSAY OF CK (CPK): CPT

## 2024-12-18 PROCEDURE — 70450 CT HEAD/BRAIN W/O DYE: CPT | Mod: MC

## 2024-12-18 PROCEDURE — 88312 SPECIAL STAINS GROUP 1: CPT

## 2024-12-18 PROCEDURE — 83540 ASSAY OF IRON: CPT

## 2024-12-18 PROCEDURE — 87637 SARSCOV2&INF A&B&RSV AMP PRB: CPT

## 2024-12-18 PROCEDURE — 96376 TX/PRO/DX INJ SAME DRUG ADON: CPT

## 2024-12-18 PROCEDURE — 83735 ASSAY OF MAGNESIUM: CPT

## 2024-12-18 PROCEDURE — 81003 URINALYSIS AUTO W/O SCOPE: CPT

## 2024-12-18 PROCEDURE — 83880 ASSAY OF NATRIURETIC PEPTIDE: CPT

## 2024-12-18 PROCEDURE — 85025 COMPLETE CBC W/AUTO DIFF WBC: CPT

## 2024-12-18 PROCEDURE — 36430 TRANSFUSION BLD/BLD COMPNT: CPT

## 2024-12-18 PROCEDURE — 87040 BLOOD CULTURE FOR BACTERIA: CPT

## 2024-12-18 PROCEDURE — 83550 IRON BINDING TEST: CPT

## 2024-12-18 PROCEDURE — 93005 ELECTROCARDIOGRAM TRACING: CPT

## 2024-12-18 PROCEDURE — 80048 BASIC METABOLIC PNL TOTAL CA: CPT

## 2024-12-18 PROCEDURE — 82962 GLUCOSE BLOOD TEST: CPT

## 2024-12-18 RX ORDER — ROSUVASTATIN CALCIUM 5 MG/1
1 TABLET, FILM COATED ORAL
Refills: 0 | DISCHARGE

## 2024-12-18 RX ORDER — PANTOPRAZOLE SODIUM 40 MG/1
1 TABLET, DELAYED RELEASE ORAL
Refills: 0
Start: 2024-12-18

## 2024-12-18 RX ORDER — CLOPIDOGREL 75 MG/1
1 TABLET, FILM COATED ORAL
Refills: 0 | DISCHARGE

## 2024-12-18 RX ORDER — LOSARTAN POTASSIUM 100 MG/1
1 TABLET, FILM COATED ORAL
Refills: 0 | DISCHARGE

## 2024-12-18 RX ORDER — MECLIZINE HCL 12.5 MG
1 TABLET ORAL
Qty: 0 | Refills: 0 | DISCHARGE
Start: 2024-12-18

## 2024-12-18 RX ORDER — PANTOPRAZOLE SODIUM 40 MG/1
1 TABLET, DELAYED RELEASE ORAL
Qty: 60 | Refills: 0
Start: 2024-12-18 | End: 2025-01-16

## 2024-12-18 RX ORDER — METOPROLOL TARTRATE 100 MG/1
1 TABLET, FILM COATED ORAL
Refills: 0 | DISCHARGE

## 2024-12-18 RX ORDER — SPIRONOLACTONE 25 MG
1 TABLET ORAL
Refills: 0 | DISCHARGE

## 2024-12-18 RX ADMIN — Medication 81 MILLIGRAM(S): at 11:38

## 2024-12-18 RX ADMIN — PANTOPRAZOLE SODIUM 40 MILLIGRAM(S): 40 TABLET, DELAYED RELEASE ORAL at 05:08

## 2024-12-18 NOTE — PROGRESS NOTE ADULT - SUBJECTIVE AND OBJECTIVE BOX
HPI:  39y M with PMH of chronic b/l otitis media, STEMI (2023) s/p stent, HLD, HTN, and DM presenting with vertigo, left ear tinnitus/fullness, and generalized weakness. Patient states that yesterday he started feeling "bad", described as generalized weakness, and later suddenly developed dizziness described as head spinning while standing up in the bathroom. Says he immediately laid down on the floor and felt better. After lying on the floor for a few minutes he stood up but says he quickly developed the same head spinning sensation and instability. Says he has never experienced similar symptoms in the past and was worried symptoms were related to his heart given recent STEMI so he came to the ED. Endorses a ringing in his left ear as well as feeling of fullness in the left ear since yesterday however denies pain or discharge from the ear. Uses hearing aide in L ear only.  Patient has a history of frequent ear infections and states that in the past they have been associated pain and and fluid leaking from the ears. Per prior ENT notes in [facility] patient has known bilateral eustachian tube dysfunction and has been offered balloon dilation procedure in the past. Patient denies fever, chills, headache, chest pain, palpitations, shortness of breath, abdominal pain, nausea, vomiting, diarrhea, constipation, and dysuria. When asked if he thought his left face was swollen he said he had not noticed but when showed a picture of himself on cell phone he agreed that the left cheek and jaw appears more swollen than baseline.    (14 Dec 2024 06:53)      Patient is a 39y old  Male who presents with a chief complaint of acute vertigo w/ severe sepsis (18 Dec 2024 11:52)      INTERVAL HPI/OVERNIGHT EVENTS:  T(C): 36.4 (12-18-24 @ 13:25), Max: 36.7 (12-18-24 @ 05:08)  HR: 80 (12-18-24 @ 13:25) (78 - 80)  BP: 99/63 (12-18-24 @ 13:25) (99/63 - 120/67)  RR: 18 (12-18-24 @ 13:25) (17 - 18)  SpO2: 100% (12-18-24 @ 13:25) (99% - 100%)  Wt(kg): --  I&O's Summary      REVIEW OF SYSTEMS: denies fever, chills, SOB, palpitations, chest pain, abdominal pain, nausea, vomitting, diarrhea, constipation, dizziness    MEDICATIONS  (STANDING):  aspirin  chewable 81 milliGRAM(s) Oral daily  atorvastatin 40 milliGRAM(s) Oral at bedtime  influenza   Vaccine 0.5 milliLiter(s) IntraMuscular once  insulin lispro (ADMELOG) corrective regimen sliding scale   SubCutaneous three times a day before meals  insulin lispro (ADMELOG) corrective regimen sliding scale   SubCutaneous at bedtime  pantoprazole    Tablet 40 milliGRAM(s) Oral two times a day    MEDICATIONS  (PRN):  acetaminophen     Tablet .. 650 milliGRAM(s) Oral every 6 hours PRN Temp greater or equal to 38C (100.4F), Mild Pain (1 - 3)  meclizine 25 milliGRAM(s) Oral every 8 hours PRN Dizziness  ondansetron Injectable 4 milliGRAM(s) IV Push every 8 hours PRN Nausea and/or Vomiting      PHYSICAL EXAM:  GENERAL: NAD, well-groomed, well-developed  HEAD:  Atraumatic, Normocephalic  EYES: EOMI, PERRLA, conjunctiva and sclera clear  ENMT: No tonsillar erythema, exudates, or enlargement; Moist mucous membranes, Good dentition, No lesions  NECK: Supple, No JVD, Normal thyroid  NERVOUS SYSTEM:  Alert & Oriented X3, Good concentration; Motor Strength 5/5 B/L upper and lower extremities; DTRs 2+ intact and symmetric  CHEST/LUNG: Clear to percussion bilaterally; No rales, rhonchi, wheezing, or rubs  HEART: Regular rate and rhythm; No murmurs, rubs, or gallops  ABDOMEN: Soft, Nontender, Nondistended; Bowel sounds present  EXTREMITIES:  2+ Peripheral Pulses, No clubbing, cyanosis, or edema  LYMPH: No lymphadenopathy noted  SKIN: No rashes or lesions  LABS:                        10.2   7.88  )-----------( 194      ( 18 Dec 2024 05:21 )             29.6     12-18    141  |  111[H]  |  13  ----------------------------<  103[H]  3.5   |  27  |  0.74    Ca    8.5      18 Dec 2024 05:21    TPro  6.0  /  Alb  3.2[L]  /  TBili  0.9  /  DBili  x   /  AST  18  /  ALT  43  /  AlkPhos  50  12-18      Urinalysis Basic - ( 18 Dec 2024 05:21 )    Color: x / Appearance: x / SG: x / pH: x  Gluc: 103 mg/dL / Ketone: x  / Bili: x / Urobili: x   Blood: x / Protein: x / Nitrite: x   Leuk Esterase: x / RBC: x / WBC x   Sq Epi: x / Non Sq Epi: x / Bacteria: x      CAPILLARY BLOOD GLUCOSE      POCT Blood Glucose.: 98 mg/dL (18 Dec 2024 11:25)  POCT Blood Glucose.: 107 mg/dL (18 Dec 2024 07:47)  POCT Blood Glucose.: 91 mg/dL (17 Dec 2024 21:06)        Urinalysis Basic - ( 18 Dec 2024 05:21 )    Color: x / Appearance: x / SG: x / pH: x  Gluc: 103 mg/dL / Ketone: x  / Bili: x / Urobili: x   Blood: x / Protein: x / Nitrite: x   Leuk Esterase: x / RBC: x / WBC x   Sq Epi: x / Non Sq Epi: x / Bacteria: x

## 2024-12-18 NOTE — PROGRESS NOTE ADULT - ASSESSMENT
Patient is a 39y old  Male with PMH of chronic b/l otitis media, STEMI (2023) s/p stent, HLD, HTN, and DM, now presents to the ER for evaluation of acute dizziness. left ear tinnitus/fullness, and generalized weakness. On admission, he found to have no fever but tachycardia and Leukocytosis. The CXR  and urine analysis is negative, blood cultures and CT scan of ear/head is pending. He has started on Zosyn and IV Vancomycin, and the ID consult requested to assist with further evaluation and antibiotic management,    # Sepsis ( Tachycardia + Leukocytosis) - resolved, Blood cultures have NGTD  # Probable Left otitis media- CT scan is negative  # Acute blood loss -  s/p EGD on 12/16 and shows irregular Z line, two 1-2 mm clean based ulcers in the stomach antrum with surrounding edema/hyperemia. Five cold forceps bx obtained to eval for H pylori, severe bulb duodenitis with several clean based ulcers.    would recommend     1. Monitor off ABx since All Infectious work up is negative  2. Monitor H/H and transfuse as needed   3. Follow up biopsy results  4. OOb to chair     d/w Patient and Dr. Boateng    Attending Attestation:    Spent more than 35 minutes on total encounter, more than 50 % of the visit was spent counseling and/or coordinating care by the Attending physician.

## 2024-12-18 NOTE — PROGRESS NOTE ADULT - PROVIDER SPECIALTY LIST ADULT
Cardiology
Internal Medicine
Internal Medicine
Cardiology
Infectious Disease
Cardiology
Cardiology
Gastroenterology
Infectious Disease
Internal Medicine

## 2024-12-18 NOTE — PROGRESS NOTE ADULT - REASON FOR ADMISSION
acute vertigo w/ severe sepsis

## 2024-12-18 NOTE — PROGRESS NOTE ADULT - ASSESSMENT
39y M with PMH of chronic b/l otitis media, STEMI (2023) s/p stent at [facility] , HLD, HTN, and DM presenting with vertigo, left ear tinnitus/fullness, and generalized weakness. Patient states that yesterday he started feeling "bad", described as generalized weakness, and later suddenly developed dizziness described as head spinning while standing up in the bathroom,sespsis. and mild elevated troponins,now GI bleed with acute anemia,PUD  1.PUD-PPI  2.CAD-asa,hold plavix,cont statin-hold b blocker.  3.GI bleed with acute anemia due to PUD.  4.DM-Insulin.  5.Lipid d/o-statin.  6.PPI.

## 2024-12-18 NOTE — PROGRESS NOTE ADULT - SUBJECTIVE AND OBJECTIVE BOX
Patient is seen and examined at the bed side, is afebrile. He is doing better, no over night events.      REVIEW OF SYSTEMS: All other review systems are negative      ALLERGIES: No Known Allergies      Vital Signs Last 24 Hrs  T(C): 36.7 (18 Dec 2024 05:08), Max: 36.7 (18 Dec 2024 05:08)  T(F): 98 (18 Dec 2024 05:08), Max: 98 (18 Dec 2024 05:08)  HR: 78 (18 Dec 2024 05:08) (77 - 81)  BP: 114/77 (18 Dec 2024 05:08) (100/61 - 120/67)  BP(mean): 89 (18 Dec 2024 05:08) (89 - 89)  RR: 17 (18 Dec 2024 05:08) (17 - 18)  SpO2: 99% (18 Dec 2024 05:08) (99% - 100%)    Parameters below as of 18 Dec 2024 05:08  Patient On (Oxygen Delivery Method): room air        PHYSICAL EXAM:  GENERAL: Not in distress   HEENT: Left ear has hearing aid, but no discharge noted  CHEST/LUNG:  Air entry bilaterally  HEART: s1 and s2 present  ABDOMEN:  Nontender and  Nondistended  EXTREMITIES: No pedal  edema  CNS: Awake and Alert      LABS:                        10.2   7.88  )-----------( 194      ( 18 Dec 2024 05:21 )             29.6                           9.6    7.99  )-----------( 162      ( 17 Dec 2024 06:25 )             27.4         12-18    141  |  111[H]  |  13  ----------------------------<  103[H]  3.5   |  27  |  0.74    Ca    8.5      18 Dec 2024 05:21    TPro  6.0  /  Alb  3.2[L]  /  TBili  0.9  /  DBili  x   /  AST  18  /  ALT  43  /  AlkPhos  50  12-18 12-17    142  |  113[H]  |  15  ----------------------------<  97  3.8   |  25  |  0.73    Ca    8.2[L]      17 Dec 2024 06:25  Phos  2.6     12-16  Mg     2.3     12-16    TPro  5.7[L]  /  Alb  3.1[L]  /  TBili  1.1  /  DBili  x   /  AST  21  /  ALT  34  /  AlkPhos  40  12-16    PT/INR - ( 14 Dec 2024 05:00 )   PT: 13.3 sec;   INR: 1.15 ratio      PTT - ( 14 Dec 2024 05:00 )  PTT:24.2 sec        CAPILLARY BLOOD GLUCOSE  POCT Blood Glucose.: 187 mg/dL (14 Dec 2024 12:02)  POCT Blood Glucose.: 122 mg/dL (14 Dec 2024 07:20)        MEDICATIONS  (STANDING):    aspirin  chewable 81 milliGRAM(s) Oral daily  atorvastatin 40 milliGRAM(s) Oral at bedtime  influenza   Vaccine 0.5 milliLiter(s) IntraMuscular once  insulin lispro (ADMELOG) corrective regimen sliding scale   SubCutaneous three times a day before meals  insulin lispro (ADMELOG) corrective regimen sliding scale   SubCutaneous at bedtime  pantoprazole    Tablet 40 milliGRAM(s) Oral two times a day      RADIOLOGY & ADDITIONAL TESTS:    12/14/24 : CT Head No Cont (12.14.24 @ 14:22) IMPRESSION: No acute intracranial hemorrhage, mass effect, or shift of the midline structures.    12/13/24 : Xray Chest 1 View-PORTABLE IMMEDIATE (Xray Chest 1 View-PORTABLE IMMEDIATE .) (12.13.24 @ 23:54) The heart is normal in size.    The lungs are clear. No pneumothorax. No pleural effusion.  No acute osseous abnormalities.      MICROBIOLOGY DATA:      Culture - Blood (12.14.24 @ 05:00)   Specimen Source: .Blood BLOOD  Culture Results: No growth at 4 days    Culture - Blood (12.14.24 @ 04:50)   Specimen Source: .Blood BLOOD  Culture Results: No growth at 4 days

## 2024-12-18 NOTE — PROGRESS NOTE ADULT - ASSESSMENT
seen and examined  on chair comfortabledenies abd pain any rectal bleed / dark stools   vs stable  no dizzyness   lungs heart ok   abd soft bs nml nt nd  no hsplenomegaly  labs noted   hgb 10.2   a/p s/p acute GIB  upper sec to gastric and duoedenal ulcer sec to asa and plavix    i d/w pts PCP  dr fields  as per him his hgb was 16 in october  pt came with dizzyness   now hg is ok cont PPI  No nsaid no plavix  later cardiologist decided to stert pt on aspirin 81  as egd didnot not show acute bleeding going on  also pt had Coronary stents in nov 2023    pcp with [facility]   gi and card out pt  cardiac diet   1800, low salt  low chol low fat diet

## 2024-12-18 NOTE — PROGRESS NOTE ADULT - SUBJECTIVE AND OBJECTIVE BOX
Date of Service 12-18-24 @ 11:52    CHIEF COMPLAINT:Patient is a 39y old  Male who presents with a chief complaint of acute vertigo w/ severe sepsis .Pt appears comfortable.  	  REVIEW OF SYSTEMS:  CONSTITUTIONAL: No fever, weight loss, or fatigue  EYES: No eye pain, visual disturbances, or discharge  ENT:  No difficulty hearing, tinnitus, vertigo; No sinus or throat pain  NECK: No pain or stiffness  RESPIRATORY: No cough, wheezing, chills or hemoptysis; No Shortness of Breath  CARDIOVASCULAR: No chest pain, palpitations, passing out, dizziness, or leg swelling  GASTROINTESTINAL: No abdominal or epigastric pain. No nausea, vomiting, or hematemesis; No diarrhea or constipation. No melena or hematochezia.  GENITOURINARY: No dysuria, frequency, hematuria, or incontinence  NEUROLOGICAL: No headaches, memory loss, loss of strength, numbness, or tremors  SKIN: No itching, burning, rashes, or lesions   LYMPH Nodes: No enlarged glands  ENDOCRINE: No heat or cold intolerance; No hair loss  MUSCULOSKELETAL: No joint pain or swelling; No muscle, back, or extremity pain  PSYCHIATRIC: No depression, anxiety, mood swings, or difficulty sleeping  HEME/LYMPH: No easy bruising, or bleeding gums  ALLERGY AND IMMUNOLOGIC: No hives or eczema	      PHYSICAL EXAM:  T(C): 36.7 (12-18-24 @ 05:08), Max: 36.7 (12-18-24 @ 05:08)  HR: 78 (12-18-24 @ 05:08) (77 - 79)  BP: 114/77 (12-18-24 @ 05:08) (110/67 - 120/67)  RR: 17 (12-18-24 @ 05:08) (17 - 18)  SpO2: 99% (12-18-24 @ 05:08) (99% - 100%)      Appearance: Normal	  HEENT:   Normal oral mucosa, PERRL, EOMI	  Lymphatic: No lymphadenopathy  Cardiovascular: Normal S1 S2, No JVD, No murmurs, No edema  Respiratory: Lungs clear to auscultation	  Psychiatry: A & O x 3, Mood & affect appropriate  Gastrointestinal:  Soft, Non-tender, + BS	  Skin: No rashes, No ecchymoses, No cyanosis	  Neurologic: Non-focal  Extremities: Normal range of motion, No clubbing, cyanosis or edema  Vascular: Peripheral pulses palpable 2+ bilaterally    MEDICATIONS  (STANDING):  aspirin  chewable 81 milliGRAM(s) Oral daily  atorvastatin 40 milliGRAM(s) Oral at bedtime  influenza   Vaccine 0.5 milliLiter(s) IntraMuscular once  insulin lispro (ADMELOG) corrective regimen sliding scale   SubCutaneous three times a day before meals  insulin lispro (ADMELOG) corrective regimen sliding scale   SubCutaneous at bedtime  pantoprazole    Tablet 40 milliGRAM(s) Oral two times a day      	  	  LABS:	 	                          10.2   7.88  )-----------( 194      ( 18 Dec 2024 05:21 )             29.6     12-18    141  |  111[H]  |  13  ----------------------------<  103[H]  3.5   |  27  |  0.74    Ca    8.5      18 Dec 2024 05:21    TPro  6.0  /  Alb  3.2[L]  /  TBili  0.9  /  DBili  x   /  AST  18  /  ALT  43  /  AlkPhos  50  12-18      TSH: Thyroid Stimulating Hormone, Serum: 1.99 uU/mL (12-14 @ 05:00)

## 2024-12-19 LAB
CULTURE RESULTS: SIGNIFICANT CHANGE UP
CULTURE RESULTS: SIGNIFICANT CHANGE UP
SPECIMEN SOURCE: SIGNIFICANT CHANGE UP
SPECIMEN SOURCE: SIGNIFICANT CHANGE UP
SURGICAL PATHOLOGY STUDY: SIGNIFICANT CHANGE UP

## 2025-01-03 PROBLEM — H66.90 OTITIS MEDIA, UNSPECIFIED, UNSPECIFIED EAR: Chronic | Status: ACTIVE | Noted: 2024-12-14

## 2025-01-03 PROBLEM — I21.3 ST ELEVATION (STEMI) MYOCARDIAL INFARCTION OF UNSPECIFIED SITE: Chronic | Status: ACTIVE | Noted: 2024-12-14

## 2025-01-03 PROBLEM — H69.93 UNSPECIFIED EUSTACHIAN TUBE DISORDER, BILATERAL: Chronic | Status: ACTIVE | Noted: 2024-12-14

## 2025-01-03 PROBLEM — E78.5 HYPERLIPIDEMIA, UNSPECIFIED: Chronic | Status: ACTIVE | Noted: 2024-12-14

## 2025-01-03 PROBLEM — I10 ESSENTIAL (PRIMARY) HYPERTENSION: Chronic | Status: ACTIVE | Noted: 2024-12-14

## 2025-01-03 PROBLEM — E11.9 TYPE 2 DIABETES MELLITUS WITHOUT COMPLICATIONS: Chronic | Status: ACTIVE | Noted: 2024-12-14

## 2025-01-09 ENCOUNTER — APPOINTMENT (OUTPATIENT)
Dept: GASTROENTEROLOGY | Facility: CLINIC | Age: 40
End: 2025-01-09

## 2025-02-24 NOTE — ED ADULT NURSE NOTE - CAS DISCH TRANSFER METHOD
2/24/2025 12:56 PM  *  Tablet Messaging Message sent to patient via Patient Connect Portal for Remote Patient Monitoring     RPM Nurse message No readings in two days Hello, Please see an important message from your RPM Team:  This is a reminder that we have not received your readings for two days please enter them as soon as possible.     Please do not respond to this message; If you have a question or concern please call your Ambulatory Care Manager or your Primary Care Physician.                    Private car

## 2025-06-03 ENCOUNTER — APPOINTMENT (OUTPATIENT)
Dept: GASTROENTEROLOGY | Facility: CLINIC | Age: 40
End: 2025-06-03
Payer: MEDICAID

## 2025-06-03 VITALS
SYSTOLIC BLOOD PRESSURE: 99 MMHG | TEMPERATURE: 97.3 F | DIASTOLIC BLOOD PRESSURE: 64 MMHG | HEIGHT: 65 IN | WEIGHT: 150 LBS | HEART RATE: 63 BPM | OXYGEN SATURATION: 99 % | BODY MASS INDEX: 24.99 KG/M2

## 2025-06-03 DIAGNOSIS — K25.9 GASTRIC ULCER, UNSPECIFIED AS ACUTE OR CHRONIC, W/OUT HEMORRHAGE OR PERFORATION: ICD-10-CM

## 2025-06-03 PROCEDURE — 99203 OFFICE O/P NEW LOW 30 MIN: CPT

## 2025-06-03 RX ORDER — ROSUVASTATIN CALCIUM 40 MG/1
40 TABLET, FILM COATED ORAL
Refills: 0 | Status: ACTIVE | COMMUNITY

## 2025-06-03 RX ORDER — EZETIMIBE 10 MG/1
10 TABLET ORAL
Refills: 0 | Status: ACTIVE | COMMUNITY

## 2025-06-03 RX ORDER — METOPROLOL SUCCINATE 25 MG/1
25 TABLET, EXTENDED RELEASE ORAL
Refills: 0 | Status: ACTIVE | COMMUNITY

## 2025-06-03 RX ORDER — ASPIRIN 81 MG/1
81 TABLET, DELAYED RELEASE ORAL
Refills: 0 | Status: ACTIVE | COMMUNITY

## 2025-06-03 RX ORDER — LOSARTAN POTASSIUM 25 MG/1
25 TABLET, FILM COATED ORAL
Refills: 0 | Status: ACTIVE | COMMUNITY

## 2025-06-03 RX ORDER — EMPAGLIFLOZIN 10 MG/1
10 TABLET, FILM COATED ORAL
Refills: 0 | Status: ACTIVE | COMMUNITY

## 2025-06-03 RX ORDER — PANTOPRAZOLE 40 MG/1
40 TABLET, DELAYED RELEASE ORAL
Qty: 30 | Refills: 4 | Status: ACTIVE | COMMUNITY
Start: 2025-06-03 | End: 1900-01-01

## 2025-06-03 RX ORDER — PANTOPRAZOLE 40 MG/1
40 TABLET, DELAYED RELEASE ORAL
Refills: 0 | Status: ACTIVE | COMMUNITY

## 2025-06-04 ENCOUNTER — TRANSCRIPTION ENCOUNTER (OUTPATIENT)
Age: 40
End: 2025-06-04

## 2025-07-23 ENCOUNTER — APPOINTMENT (OUTPATIENT)
Dept: GASTROENTEROLOGY | Facility: HOSPITAL | Age: 40
End: 2025-07-23

## 2025-09-03 ENCOUNTER — APPOINTMENT (OUTPATIENT)
Dept: OTOLARYNGOLOGY | Facility: CLINIC | Age: 40
End: 2025-09-03
Payer: MEDICAID

## 2025-09-03 PROCEDURE — 92592: CPT

## 2025-09-04 ENCOUNTER — APPOINTMENT (OUTPATIENT)
Dept: OTOLARYNGOLOGY | Facility: CLINIC | Age: 40
End: 2025-09-04
Payer: MEDICAID

## 2025-09-04 DIAGNOSIS — J30.0 VASOMOTOR RHINITIS: ICD-10-CM

## 2025-09-04 DIAGNOSIS — H61.21 IMPACTED CERUMEN, RIGHT EAR: ICD-10-CM

## 2025-09-04 DIAGNOSIS — H69.93 UNSPECIFIED EUSTACHIAN TUBE DISORDER, BILATERAL: ICD-10-CM

## 2025-09-04 DIAGNOSIS — H90.0 CONDUCTIVE HEARING LOSS, BILATERAL: ICD-10-CM

## 2025-09-04 DIAGNOSIS — H66.93 OTITIS MEDIA, UNSPECIFIED, BILATERAL: ICD-10-CM

## 2025-09-04 DIAGNOSIS — H72.93 OTITIS MEDIA, UNSPECIFIED, BILATERAL: ICD-10-CM

## 2025-09-04 PROCEDURE — 92557 COMPREHENSIVE HEARING TEST: CPT

## 2025-09-04 PROCEDURE — 92567 TYMPANOMETRY: CPT

## 2025-09-04 PROCEDURE — G0268 REMOVAL OF IMPACTED WAX MD: CPT

## 2025-09-04 PROCEDURE — 99215 OFFICE O/P EST HI 40 MIN: CPT | Mod: 25

## 2025-09-04 PROCEDURE — 31231 NASAL ENDOSCOPY DX: CPT

## 2025-09-04 RX ORDER — OFLOXACIN OTIC 3 MG/ML
0.3 SOLUTION AURICULAR (OTIC)
Qty: 1 | Refills: 1 | Status: ACTIVE | COMMUNITY
Start: 2025-09-04 | End: 1900-01-01

## 2025-09-08 ENCOUNTER — APPOINTMENT (OUTPATIENT)
Dept: OTOLARYNGOLOGY | Facility: CLINIC | Age: 40
End: 2025-09-08

## (undated) DEVICE — TUBING MEDI-VAC W MAXIGRIP CONNECTORS 1/4"X6'

## (undated) DEVICE — SOL IRR POUR NS 0.9% 500ML

## (undated) DEVICE — DRSG 2X2

## (undated) DEVICE — SYR LUER LOK 3CC

## (undated) DEVICE — GOWN LG

## (undated) DEVICE — UNDERPAD LINEN SAVER 17 X 24"

## (undated) DEVICE — CATH IV SAFE BC 22G X 1" (BLUE)

## (undated) DEVICE — CONTAINER FORMALIN 10% 20ML

## (undated) DEVICE — TUBING IV SET GRAVITY 1Y 78" MACRO

## (undated) DEVICE — FORMALIN CONTAINER MED

## (undated) DEVICE — BITE BLOCK MOUTHPCW/STRAP

## (undated) DEVICE — MASK LRG MED AND HIGH O2 CONC M TO M 10FT

## (undated) DEVICE — SOL INJ NS 0.9% 500ML 1-PORT

## (undated) DEVICE — NDL HYPO SAFE 22G X 1" (BLACK)

## (undated) DEVICE — Device

## (undated) DEVICE — ANESTHESIA CIRCUIT ADULT HMEF

## (undated) DEVICE — RADIAL JAW 4 LG CAPACITY WITH NDL

## (undated) DEVICE — MASK O2 MICROSTREAM SAMPLE LINE MED/LRG 10FT

## (undated) DEVICE — WARMING BLANKET FULL ADULT

## (undated) DEVICE — SOLIDIFIER ISOLYZER 2000CC

## (undated) DEVICE — SALIVA EJECTOR (BLUE)

## (undated) DEVICE — PACK IV START WITH CHG

## (undated) DEVICE — VENODYNE/SCD SLEEVE CALF MEDIUM

## (undated) DEVICE — BITE BLOCK ADULT 20 X 27MM (GREEN)

## (undated) DEVICE — KIT ENDO PROCEDURE CUST W/VLV

## (undated) DEVICE — DENTURE CUP PINK

## (undated) DEVICE — LABELS BLANK W PEN

## (undated) DEVICE — DRSG CURITY GAUZE SPONGE 4 X 4" 12-PLY NON-STERILE